# Patient Record
Sex: MALE | Race: WHITE | NOT HISPANIC OR LATINO | Employment: OTHER | URBAN - METROPOLITAN AREA
[De-identification: names, ages, dates, MRNs, and addresses within clinical notes are randomized per-mention and may not be internally consistent; named-entity substitution may affect disease eponyms.]

---

## 2023-02-08 ENCOUNTER — OFFICE VISIT (OUTPATIENT)
Dept: NEUROSURGERY | Facility: CLINIC | Age: 78
End: 2023-02-08
Payer: MEDICARE

## 2023-02-08 VITALS
TEMPERATURE: 97.1 F | SYSTOLIC BLOOD PRESSURE: 132 MMHG | DIASTOLIC BLOOD PRESSURE: 82 MMHG | WEIGHT: 200 LBS | HEIGHT: 71 IN | BODY MASS INDEX: 28 KG/M2

## 2023-02-08 DIAGNOSIS — M48.061 SPINAL STENOSIS, LUMBAR REGION, WITHOUT NEUROGENIC CLAUDICATION: ICD-10-CM

## 2023-02-08 DIAGNOSIS — M54.9 MECHANICAL BACK PAIN: ICD-10-CM

## 2023-02-08 DIAGNOSIS — M51.36 DISC DEGENERATION, LUMBAR: Primary | ICD-10-CM

## 2023-02-08 PROCEDURE — 99204 OFFICE O/P NEW MOD 45 MIN: CPT | Performed by: PHYSICIAN ASSISTANT

## 2023-02-08 RX ORDER — LISINOPRIL AND HYDROCHLOROTHIAZIDE 20; 12.5 MG/1; MG/1
1 TABLET ORAL DAILY
COMMUNITY

## 2023-02-08 RX ORDER — ASPIRIN 81 MG/1
81 TABLET, CHEWABLE ORAL DAILY
COMMUNITY

## 2023-02-08 RX ORDER — EZETIMIBE/ATORVASTATIN CALCIUM 10 MG-10MG
TABLET ORAL NIGHTLY
COMMUNITY
End: 2023-03-21

## 2023-02-08 RX ORDER — METOPROLOL TARTRATE 50 MG/1
50 TABLET, FILM COATED ORAL 2 TIMES DAILY
COMMUNITY
Start: 2022-11-07

## 2023-02-08 NOTE — PROGRESS NOTES
Patient: Brandon Brooks  : 1945  Chart #: [id number]    Date of Service: 2023    CHIEF COMPLAINT: Low back and right leg pain    History of Present Illness Patient is a very pleasant 77-year-old gentleman who is retired from the [employer] business.  He has had back difficulties dating back to the 1960s.  He underwent surgery with Dr. Rodriguez in the .  From what I gather, this may have involved the L4-5 and L5-S1 levels.  Today he complains of low back pain that radiates down the posterior lateral aspect of the right leg into the top of the foot.  This has been present for several years but has progressively worsened.  Last year he was treated with epidural injections and physical therapy which failed to provide lasting relief.  He has also been treated with gabapentin but was fairly intolerant of that.  Symptoms are not positional.  His leg tends to bother him when he is more active but the pain is constant.  He denies left-sided symptoms.  No weakness.  No bowel or bladder difficulties.      Past Medical History:   Diagnosis Date   • Arthritis    • Cancer (HCC)    • Hearing loss    • Inguinal hernia    • Low back pain          Current Outpatient Medications:   •  aspirin 81 MG chewable tablet, Daily., Disp: , Rfl:   •  Ezetimibe-Atorvastatin 10-10 MG tablet, Take  by mouth Every Night., Disp: , Rfl:   •  lisinopril-hydrochlorothiazide (PRINZIDE,ZESTORETIC) 20-12.5 MG per tablet, lisinopril 20 mg-hydrochlorothiazide 12.5 mg tablet  Take by oral route., Disp: , Rfl:   •  metoprolol tartrate (LOPRESSOR) 50 MG tablet, , Disp: , Rfl:     Past Surgical History:   Procedure Laterality Date   • HERNIA REPAIR     • LUMBAR DISC SURGERY      Dr. Santiago Rodriguez    • PROSTATE SURGERY         Social History     Socioeconomic History   • Marital status:    Tobacco Use   • Smoking status: Never   • Smokeless tobacco: Never   Vaping Use   • Vaping Use: Never used   Substance and Sexual Activity   • Alcohol use: Never    • Drug use: Never   • Sexual activity: Defer         Review of Systems   Constitutional: Positive for fatigue. Negative for activity change, appetite change, chills, diaphoresis, fever and unexpected weight change.   HENT: Negative for congestion, dental problem, drooling, ear discharge, ear pain, facial swelling, hearing loss, mouth sores, nosebleeds, postnasal drip, rhinorrhea, sinus pressure, sinus pain, sneezing, sore throat, tinnitus, trouble swallowing and voice change.    Eyes: Positive for itching and visual disturbance. Negative for photophobia, pain, discharge and redness.   Respiratory: Positive for chest tightness. Negative for apnea, cough, choking, shortness of breath, wheezing and stridor.    Cardiovascular: Positive for chest pain. Negative for palpitations and leg swelling.   Gastrointestinal: Negative for abdominal distention, abdominal pain, anal bleeding, blood in stool, constipation, diarrhea, nausea, rectal pain and vomiting.   Endocrine: Negative for cold intolerance, heat intolerance, polydipsia, polyphagia and polyuria.   Genitourinary: Negative for decreased urine volume, difficulty urinating, dysuria, enuresis, flank pain, frequency, genital sores, hematuria and urgency.   Musculoskeletal: Positive for back pain. Negative for arthralgias, gait problem, joint swelling, myalgias, neck pain and neck stiffness.   Skin: Negative for color change, pallor, rash and wound.   Allergic/Immunologic: Negative for environmental allergies, food allergies and immunocompromised state.   Neurological: Positive for dizziness and light-headedness. Negative for tremors, seizures, syncope, facial asymmetry, speech difficulty, weakness, numbness and headaches.   Hematological: Negative for adenopathy. Does not bruise/bleed easily.   Psychiatric/Behavioral: Negative for agitation, behavioral problems, confusion, decreased concentration, dysphoric mood, hallucinations, self-injury, sleep disturbance and  "suicidal ideas. The patient is not nervous/anxious and is not hyperactive.    All other systems reviewed and are negative.      Objective   Vital Signs: Blood pressure 132/82, temperature 97.1 °F (36.2 °C), temperature source Infrared, height 180.3 cm (71\"), weight 90.7 kg (200 lb).  Physical Exam  Vitals and nursing note reviewed.   Constitutional:       General: He is not in acute distress.     Appearance: He is well-developed.   HENT:      Head: Normocephalic and atraumatic.   Eyes:      Pupils: Pupils are equal, round, and reactive to light.   Cardiovascular:      Heart sounds: Normal heart sounds.   Pulmonary:      Breath sounds: Normal breath sounds.   Psychiatric:         Behavior: Behavior normal.         Thought Content: Thought content normal.     Musculoskeletal:     Strength is intact in upper and lower extremities to direct testing.     Station and gait are normal.     Straight leg raising is negative.   Neurologic:     Muscle tone is normal throughout.     Coordination is intact.     Deep tendon reflexes: Diminished in the lower extremity.     Sensation is intact to light touch throughout.     Patient is oriented to person, place, and time.       Independent review of radiographic imaging: MRI of the lumbar spine demonstrates some retrolisthesis of L3 on 4 with moderate canal stenosis.  At L4-5 there is bilateral foraminal narrowing at L5-S1 there is some foraminal narrowing.  Postoperative hemilaminectomy presumed to be at L5-S1    Assessment & Plan   Diagnosis:  1.  Lumbar radiculopathy  2.  Chronic back pain    Medical Decision Making: I reviewed imaging with Dr. Sood is recommended lumbar CT myelogram to better determine the level of nerve root compromise.  I have had a tanisha conversation with the patient today.  If we were to consider surgery, the goal would be to help his leg pain.  There will be no assurance that we can take away all of his back difficulties.  He is very understanding.  His " leg pain has been limiting him for some time.  He would like to be able to return to golf.  He will follow-up with Dr. Sood to review and further recommendations will be made at that time.    Diagnoses and all orders for this visit:    1. Disc degeneration, lumbar (Primary)  -     IR Myelogram Lumbar Spine; Future  -     CT Lumbar Spine With Intrathecal Contrast; Future    2. Spinal stenosis, lumbar region, without neurogenic claudication    3. Mechanical back pain    Other orders  -     Obtain Informed Consent; Standing  -     No Lab Testing Needed; Standing                      Yumiko Aguilera PA-C  Patient Care Team:  Juan Sosa MD as PCP - General (Family Medicine)

## 2023-02-14 ENCOUNTER — TELEPHONE (OUTPATIENT)
Dept: NEUROSURGERY | Facility: CLINIC | Age: 78
End: 2023-02-14
Payer: MEDICARE

## 2023-02-14 NOTE — TELEPHONE ENCOUNTER
Caller: ANDREIA GUADALUPE    Relationship to patient: Emergency Contact    Best call back number: [phone]    Patient is needing: PATIENTS WIFE CALLED TO CHECK STATUS OF SCHEDULING MYELOGRAM. PLEASE CALL PATIENT OR PATIENTS WIFE TO SCHEDULE.    THANK YOU

## 2023-02-27 ENCOUNTER — TELEPHONE (OUTPATIENT)
Dept: INFUSION THERAPY | Facility: HOSPITAL | Age: 78
End: 2023-02-27
Payer: MEDICARE

## 2023-02-27 RX ORDER — ACETAMINOPHEN 325 MG/1
650 TABLET ORAL EVERY 6 HOURS PRN
COMMUNITY

## 2023-02-27 NOTE — TELEPHONE ENCOUNTER
Verified pts appt time for 6 a.m., nothing to eat after midnight, can take home meds, and reviewed plan of care for procedure tomorrow.

## 2023-02-28 ENCOUNTER — HOSPITAL ENCOUNTER (OUTPATIENT)
Dept: GENERAL RADIOLOGY | Facility: HOSPITAL | Age: 78
Discharge: HOME | End: 2023-02-28
Payer: MEDICARE

## 2023-02-28 ENCOUNTER — HOSPITAL ENCOUNTER (OUTPATIENT)
Dept: CT IMAGING | Facility: HOSPITAL | Age: 78
Discharge: HOME | End: 2023-02-28
Payer: MEDICARE

## 2023-02-28 VITALS
DIASTOLIC BLOOD PRESSURE: 112 MMHG | SYSTOLIC BLOOD PRESSURE: 182 MMHG | OXYGEN SATURATION: 96 % | WEIGHT: 201 LBS | TEMPERATURE: 97.1 F | RESPIRATION RATE: 18 BRPM | HEART RATE: 70 BPM | HEIGHT: 71 IN | BODY MASS INDEX: 28.14 KG/M2

## 2023-02-28 DIAGNOSIS — M51.36 DISC DEGENERATION, LUMBAR: ICD-10-CM

## 2023-02-28 DIAGNOSIS — M54.16 LUMBAR RADICULOPATHY: ICD-10-CM

## 2023-02-28 PROCEDURE — 25510000001 IOPAMIDOL 41 % SOLUTION: Performed by: PHYSICIAN ASSISTANT

## 2023-02-28 PROCEDURE — 72132 CT LUMBAR SPINE W/DYE: CPT

## 2023-02-28 PROCEDURE — 63710000001 LISINOPRIL 20 MG TABLET: Performed by: NEUROLOGICAL SURGERY

## 2023-02-28 PROCEDURE — 72240 MYELOGRAPHY NECK SPINE: CPT

## 2023-02-28 PROCEDURE — 63710000001 METOPROLOL TARTRATE 50 MG TABLET: Performed by: NEUROLOGICAL SURGERY

## 2023-02-28 PROCEDURE — 62284 INJECTION FOR MYELOGRAM: CPT | Performed by: NEUROLOGICAL SURGERY

## 2023-02-28 PROCEDURE — 62304 MYELOGRAPHY LUMBAR INJECTION: CPT

## 2023-02-28 PROCEDURE — 72120 X-RAY BEND ONLY L-S SPINE: CPT

## 2023-02-28 PROCEDURE — A9270 NON-COVERED ITEM OR SERVICE: HCPCS | Performed by: NEUROLOGICAL SURGERY

## 2023-02-28 PROCEDURE — 0 LIDOCAINE 1 % SOLUTION: Performed by: PHYSICIAN ASSISTANT

## 2023-02-28 RX ORDER — LISINOPRIL 20 MG/1
20 TABLET ORAL ONCE
Status: COMPLETED | OUTPATIENT
Start: 2023-02-28 | End: 2023-02-28

## 2023-02-28 RX ORDER — LIDOCAINE HYDROCHLORIDE 10 MG/ML
5 INJECTION, SOLUTION INFILTRATION; PERINEURAL ONCE
Status: COMPLETED | OUTPATIENT
Start: 2023-02-28 | End: 2023-02-28

## 2023-02-28 RX ORDER — PROMETHAZINE HYDROCHLORIDE 25 MG/1
25 TABLET ORAL ONCE AS NEEDED
Status: DISCONTINUED | OUTPATIENT
Start: 2023-02-28 | End: 2023-03-02 | Stop reason: HOSPADM

## 2023-02-28 RX ORDER — ONDANSETRON 4 MG/1
4 TABLET, FILM COATED ORAL ONCE AS NEEDED
Status: DISCONTINUED | OUTPATIENT
Start: 2023-02-28 | End: 2023-03-02 | Stop reason: HOSPADM

## 2023-02-28 RX ORDER — METOPROLOL TARTRATE 50 MG/1
50 TABLET, FILM COATED ORAL ONCE
Status: COMPLETED | OUTPATIENT
Start: 2023-02-28 | End: 2023-02-28

## 2023-02-28 RX ADMIN — LIDOCAINE HYDROCHLORIDE 5 ML: 10 INJECTION, SOLUTION INFILTRATION; PERINEURAL at 07:46

## 2023-02-28 RX ADMIN — IOPAMIDOL 20 ML: 408 INJECTION, SOLUTION INTRATHECAL at 07:45

## 2023-02-28 RX ADMIN — LISINOPRIL 20 MG: 20 TABLET ORAL at 06:52

## 2023-02-28 RX ADMIN — METOPROLOL TARTRATE 50 MG: 50 TABLET, FILM COATED ORAL at 06:52

## 2023-03-01 ENCOUNTER — TELEPHONE (OUTPATIENT)
Dept: INFUSION THERAPY | Facility: HOSPITAL | Age: 78
End: 2023-03-01
Payer: MEDICARE

## 2023-03-03 ENCOUNTER — OFFICE VISIT (OUTPATIENT)
Dept: NEUROSURGERY | Facility: CLINIC | Age: 78
End: 2023-03-03
Payer: MEDICARE

## 2023-03-03 ENCOUNTER — PREP FOR SURGERY (OUTPATIENT)
Dept: OTHER | Facility: HOSPITAL | Age: 78
End: 2023-03-03
Payer: MEDICARE

## 2023-03-03 VITALS — BODY MASS INDEX: 28.59 KG/M2 | WEIGHT: 204.2 LBS | HEIGHT: 71 IN | TEMPERATURE: 97.7 F

## 2023-03-03 DIAGNOSIS — M54.16 LUMBAR RADICULOPATHY, RIGHT: ICD-10-CM

## 2023-03-03 DIAGNOSIS — M43.10 SPONDYLOLISTHESIS, ACQUIRED: Primary | ICD-10-CM

## 2023-03-03 DIAGNOSIS — M51.36 DISC DEGENERATION, LUMBAR: ICD-10-CM

## 2023-03-03 DIAGNOSIS — M48.061 SPINAL STENOSIS, LUMBAR REGION, WITHOUT NEUROGENIC CLAUDICATION: Primary | ICD-10-CM

## 2023-03-03 PROBLEM — E78.5 HLD (HYPERLIPIDEMIA): Status: ACTIVE | Noted: 2023-03-03

## 2023-03-03 PROBLEM — N18.9 CKD (CHRONIC KIDNEY DISEASE): Status: ACTIVE | Noted: 2023-03-03

## 2023-03-03 PROBLEM — R00.2 PALPITATIONS: Status: ACTIVE | Noted: 2023-03-03

## 2023-03-03 PROBLEM — E11.9 DIABETES: Status: ACTIVE | Noted: 2023-03-03

## 2023-03-03 PROBLEM — M19.90 ARTHRITIS: Status: ACTIVE | Noted: 2023-03-03

## 2023-03-03 PROBLEM — R53.83 FATIGUE: Status: ACTIVE | Noted: 2023-03-03

## 2023-03-03 PROBLEM — I49.3 PVC (PREMATURE VENTRICULAR CONTRACTION): Status: ACTIVE | Noted: 2023-03-03

## 2023-03-03 PROBLEM — I10 HYPERTENSION: Status: ACTIVE | Noted: 2023-03-03

## 2023-03-03 PROCEDURE — 99214 OFFICE O/P EST MOD 30 MIN: CPT | Performed by: NEUROLOGICAL SURGERY

## 2023-03-03 RX ORDER — OXYCODONE HCL 10 MG/1
10 TABLET, FILM COATED, EXTENDED RELEASE ORAL ONCE
Status: CANCELLED | OUTPATIENT
Start: 2023-03-03 | End: 2023-03-03

## 2023-03-03 RX ORDER — CHLORHEXIDINE GLUCONATE 4 G/100ML
SOLUTION TOPICAL
Qty: 120 ML | Refills: 0 | Status: ON HOLD | OUTPATIENT
Start: 2023-03-03 | End: 2023-03-27

## 2023-03-03 RX ORDER — ACETAMINOPHEN 500 MG
1000 TABLET ORAL ONCE
Status: CANCELLED | OUTPATIENT
Start: 2023-03-03 | End: 2023-03-03

## 2023-03-03 RX ORDER — CLONIDINE HYDROCHLORIDE 0.1 MG/1
0.1 TABLET ORAL AS NEEDED
COMMUNITY
Start: 2023-02-28

## 2023-03-03 RX ORDER — FAMOTIDINE 20 MG/1
20 TABLET, FILM COATED ORAL
Status: CANCELLED | OUTPATIENT
Start: 2023-03-03

## 2023-03-03 RX ORDER — IBUPROFEN 800 MG/1
800 TABLET ORAL ONCE
Status: CANCELLED | OUTPATIENT
Start: 2023-03-03 | End: 2023-03-03

## 2023-03-03 RX ORDER — CEFAZOLIN SODIUM 2 G/100ML
2 INJECTION, SOLUTION INTRAVENOUS ONCE
Status: CANCELLED | OUTPATIENT
Start: 2023-03-03 | End: 2023-03-03

## 2023-03-03 NOTE — H&P (VIEW-ONLY)
Patient: Brandon Brooks  : 1945     Primary Care Provider: Juan Sosa MD     Requesting Provider: As above           History     Chief Complaint: Low back and right leg pain.     History of Present Illness: Mr. Brooks is a very pleasant 77-year-old gentleman who is retired from the [email or web address] business.  He has had back difficulties dating back to the 1960s.  He underwent surgery with Dr. Rodriguez in the s.  From what I gather, this may have involved the L4-5 and L5-S1 levels.  Today he complains of low back pain that radiates down the posterior lateral aspect of the right leg into the top of the foot.  This has been present for several years but has progressively worsened.  Last year he was treated with epidural injections and physical therapy which failed to provide lasting relief.  He has also been treated with gabapentin but was fairly intolerant of that.  Symptoms are not positional.  His leg tends to bother him when he is more active but the pain is constant.  He denies left-sided symptoms.  No weakness.  No bowel or bladder difficulties.     Review of Systems   Constitutional: Positive for fatigue. Negative for activity change, appetite change, chills, diaphoresis, fever and unexpected weight change.   HENT: Negative for congestion, dental problem, drooling, ear discharge, ear pain, facial swelling, hearing loss, mouth sores, nosebleeds, postnasal drip, rhinorrhea, sinus pressure, sinus pain, sneezing, sore throat, tinnitus, trouble swallowing and voice change.    Eyes: Positive for itching and visual disturbance. Negative for photophobia, pain, discharge and redness.   Respiratory: Positive for chest tightness. Negative for apnea, cough, choking, shortness of breath, wheezing and stridor.    Cardiovascular: Positive for chest pain. Negative for palpitations and leg swelling.   Gastrointestinal: Negative for abdominal distention, abdominal pain, anal bleeding, blood in stool, constipation, diarrhea,  nausea, rectal pain and vomiting.   Endocrine: Negative for cold intolerance, heat intolerance, polydipsia, polyphagia and polyuria.   Genitourinary: Negative for decreased urine volume, difficulty urinating, dysuria, enuresis, flank pain, frequency, genital sores, hematuria and urgency.   Musculoskeletal: Positive for back pain. Negative for arthralgias, gait problem, joint swelling, myalgias, neck pain and neck stiffness.   Skin: Negative for color change, pallor, rash and wound.   Allergic/Immunologic: Negative for environmental allergies, food allergies and immunocompromised state.   Neurological: Positive for dizziness and light-headedness. Negative for tremors, seizures, syncope, facial asymmetry, speech difficulty, weakness, numbness and headaches.   Hematological: Negative for adenopathy. Does not bruise/bleed easily.   Psychiatric/Behavioral: Negative for agitation, behavioral problems, confusion, decreased concentration, dysphoric mood, hallucinations, self-injury, sleep disturbance and suicidal ideas. The patient is not nervous/anxious and is not hyperactive.    All other systems reviewed and are negative.        The patient's past medical history, past surgical history, family history, and social history have been reviewed at length in the electronic medical record.  Past Medical History:   Diagnosis Date   • Arthritis    • Cancer (HCC)    • Hearing loss    • Inguinal hernia    • Low back pain      Past Surgical History:   Procedure Laterality Date   • HERNIA REPAIR     • LUMBAR DISC SURGERY      Dr. Santiago Rodriguez 1990   • PROSTATE SURGERY       Family History   Problem Relation Age of Onset   • Cancer Mother    • Heart disease Father    • Cancer Brother      Social History     Socioeconomic History   • Marital status:    Tobacco Use   • Smoking status: Never   • Smokeless tobacco: Never   Vaping Use   • Vaping Use: Never used   Substance and Sexual Activity   • Alcohol use: Never   • Drug use: Never  "  • Sexual activity: Defer           Allergies   Allergen Reactions   • Levofloxacin Other (See Comments)       Current Outpatient Medications on File Prior to Visit   Medication Sig Dispense Refill   • acetaminophen (TYLENOL) 325 MG tablet Take 2 tablets by mouth Every 6 (Six) Hours As Needed for Mild Pain. On rare occassion     • aspirin 81 MG chewable tablet Daily.     • cloNIDine (CATAPRES) 0.1 MG tablet      • Ezetimibe-Atorvastatin 10-10 MG tablet Take  by mouth Every Night.     • lisinopril-hydrochlorothiazide (PRINZIDE,ZESTORETIC) 20-12.5 MG per tablet lisinopril 20 mg-hydrochlorothiazide 12.5 mg tablet   Take by oral route.     • metoprolol tartrate (LOPRESSOR) 50 MG tablet        No current facility-administered medications on file prior to visit.            Physical Exam:   Temp 97.7 °F (36.5 °C)   Ht 180.3 cm (71\")   Wt 92.6 kg (204 lb 3.2 oz)   BMI 28.48 kg/m²   MUSCULOSKELETAL:  Straight leg raising is negative.  Wilber's Sign is negative.  ROM in the low back is normal.  Tenderness in the back to palpation is not observed.  NEUROLOGICAL:  Strength is intact in the lower extremities to direct testing.  Muscle tone is normal throughout.  Station and gait are normal.  Sensation is intact to light touch testing throughout.  Coordination is intact.        Medical Decision Making     Data Review:   (All imaging studies were personally reviewed unless stated otherwise)  Lumbar CT myelogram demonstrates some truncation of the left at L2-3 and on the right at L3-4.  There is significant disc space narrowing at L5-S1 with foraminal compromise particularly on the right.  Laminotomy defect is noted on the right at L4-5.  There is a retrolisthesis of L3 on L4 that is stable.  There is a low-grade listhesis of L5 on S1 that is also stable.     Diagnosis:   The patient's symptoms really are that of an L5 radiculopathy.  He has no symptoms to suggest an L4 nerve root compromise.  I think his L5 symptoms are " emanating from foraminal compromise on the right at L5-S1.     Treatment Options:   The patient is struggling and as such I have recommended L5-S1 PLIF.  The nature of the procedure as well as the potential risks, complications, limitations, and alternatives to the procedure were discussed at length with the patient and the patient has agreed to proceed with surgery.  The goal of surgery is to improve his symptoms.  It will not necessarily eradicate his symptoms some of which are very longstanding.          Diagnosis Plan   1. Spinal stenosis, lumbar region, without neurogenic claudication          2. Lumbar radiculopathy, right          3. Disc degeneration, lumbar

## 2023-03-03 NOTE — PROGRESS NOTES
Patient: Brandon Brooks  : 1945    Primary Care Provider: Juan Sosa MD    Requesting Provider: As above        History    Chief Complaint: Low back and right leg pain.    History of Present Illness: Mr. Brooks is a very pleasant 77-year-old gentleman who is retired from the [employer] business.  He has had back difficulties dating back to the 1960s.  He underwent surgery with Dr. Rodriguez in the s.  From what I gather, this may have involved the L4-5 and L5-S1 levels.  Today he complains of low back pain that radiates down the posterior lateral aspect of the right leg into the top of the foot.  This has been present for several years but has progressively worsened.  Last year he was treated with epidural injections and physical therapy which failed to provide lasting relief.  He has also been treated with gabapentin but was fairly intolerant of that.  Symptoms are not positional.  His leg tends to bother him when he is more active but the pain is constant.  He denies left-sided symptoms.  No weakness.  No bowel or bladder difficulties.    Review of Systems   Constitutional: Positive for fatigue. Negative for activity change, appetite change, chills, diaphoresis, fever and unexpected weight change.   HENT: Negative for congestion, dental problem, drooling, ear discharge, ear pain, facial swelling, hearing loss, mouth sores, nosebleeds, postnasal drip, rhinorrhea, sinus pressure, sinus pain, sneezing, sore throat, tinnitus, trouble swallowing and voice change.    Eyes: Positive for itching and visual disturbance. Negative for photophobia, pain, discharge and redness.   Respiratory: Positive for chest tightness. Negative for apnea, cough, choking, shortness of breath, wheezing and stridor.    Cardiovascular: Positive for chest pain. Negative for palpitations and leg swelling.   Gastrointestinal: Negative for abdominal distention, abdominal pain, anal bleeding, blood in stool, constipation, diarrhea, nausea,  "rectal pain and vomiting.   Endocrine: Negative for cold intolerance, heat intolerance, polydipsia, polyphagia and polyuria.   Genitourinary: Negative for decreased urine volume, difficulty urinating, dysuria, enuresis, flank pain, frequency, genital sores, hematuria and urgency.   Musculoskeletal: Positive for back pain. Negative for arthralgias, gait problem, joint swelling, myalgias, neck pain and neck stiffness.   Skin: Negative for color change, pallor, rash and wound.   Allergic/Immunologic: Negative for environmental allergies, food allergies and immunocompromised state.   Neurological: Positive for dizziness and light-headedness. Negative for tremors, seizures, syncope, facial asymmetry, speech difficulty, weakness, numbness and headaches.   Hematological: Negative for adenopathy. Does not bruise/bleed easily.   Psychiatric/Behavioral: Negative for agitation, behavioral problems, confusion, decreased concentration, dysphoric mood, hallucinations, self-injury, sleep disturbance and suicidal ideas. The patient is not nervous/anxious and is not hyperactive.    All other systems reviewed and are negative.      The patient's past medical history, past surgical history, family history, and social history have been reviewed at length in the electronic medical record.      Physical Exam:   Temp 97.7 °F (36.5 °C)   Ht 180.3 cm (71\")   Wt 92.6 kg (204 lb 3.2 oz)   BMI 28.48 kg/m²   MUSCULOSKELETAL:  Straight leg raising is negative.  Wilber's Sign is negative.  ROM in the low back is normal.  Tenderness in the back to palpation is not observed.  NEUROLOGICAL:  Strength is intact in the lower extremities to direct testing.  Muscle tone is normal throughout.  Station and gait are normal.  Sensation is intact to light touch testing throughout.  Coordination is intact.      Medical Decision Making    Data Review:   (All imaging studies were personally reviewed unless stated otherwise)  Lumbar CT myelogram demonstrates " some truncation of the left at L2-3 and on the right at L3-4.  There is significant disc space narrowing at L5-S1 with foraminal compromise particularly on the right.  Laminotomy defect is noted on the right at L4-5.  There is a retrolisthesis of L3 on L4 that is stable.  There is a low-grade listhesis of L5 on S1 that is also stable.    Diagnosis:   The patient's symptoms really are that of an L5 radiculopathy.  He has no symptoms to suggest an L4 nerve root compromise.  I think his L5 symptoms are emanating from foraminal compromise on the right at L5-S1.    Treatment Options:   The patient is struggling and as such I have recommended L5-S1 PLIF.  The nature of the procedure as well as the potential risks, complications, limitations, and alternatives to the procedure were discussed at length with the patient and the patient has agreed to proceed with surgery.  The goal of surgery is to improve his symptoms.  It will not necessarily eradicate his symptoms some of which are very longstanding.       Diagnosis Plan   1. Spinal stenosis, lumbar region, without neurogenic claudication        2. Lumbar radiculopathy, right        3. Disc degeneration, lumbar            Scribed for Lenin Sood MD by Laura Washington CMA. 3/3/2023 13:05 EST    I, Dr. Sood, personally performed the services described in the documentation, as scribed in my presence, and it is both accurate and complete.

## 2023-03-03 NOTE — H&P
Patient: Brandon Brooks  : 1945     Primary Care Provider: Juan Sosa MD     Requesting Provider: As above           History     Chief Complaint: Low back and right leg pain.     History of Present Illness: Mr. Brooks is a very pleasant 77-year-old gentleman who is retired from the [employer] business.  He has had back difficulties dating back to the 1960s.  He underwent surgery with Dr. Rodriguez in the s.  From what I gather, this may have involved the L4-5 and L5-S1 levels.  Today he complains of low back pain that radiates down the posterior lateral aspect of the right leg into the top of the foot.  This has been present for several years but has progressively worsened.  Last year he was treated with epidural injections and physical therapy which failed to provide lasting relief.  He has also been treated with gabapentin but was fairly intolerant of that.  Symptoms are not positional.  His leg tends to bother him when he is more active but the pain is constant.  He denies left-sided symptoms.  No weakness.  No bowel or bladder difficulties.     Review of Systems   Constitutional: Positive for fatigue. Negative for activity change, appetite change, chills, diaphoresis, fever and unexpected weight change.   HENT: Negative for congestion, dental problem, drooling, ear discharge, ear pain, facial swelling, hearing loss, mouth sores, nosebleeds, postnasal drip, rhinorrhea, sinus pressure, sinus pain, sneezing, sore throat, tinnitus, trouble swallowing and voice change.    Eyes: Positive for itching and visual disturbance. Negative for photophobia, pain, discharge and redness.   Respiratory: Positive for chest tightness. Negative for apnea, cough, choking, shortness of breath, wheezing and stridor.    Cardiovascular: Positive for chest pain. Negative for palpitations and leg swelling.   Gastrointestinal: Negative for abdominal distention, abdominal pain, anal bleeding, blood in stool, constipation, diarrhea,  nausea, rectal pain and vomiting.   Endocrine: Negative for cold intolerance, heat intolerance, polydipsia, polyphagia and polyuria.   Genitourinary: Negative for decreased urine volume, difficulty urinating, dysuria, enuresis, flank pain, frequency, genital sores, hematuria and urgency.   Musculoskeletal: Positive for back pain. Negative for arthralgias, gait problem, joint swelling, myalgias, neck pain and neck stiffness.   Skin: Negative for color change, pallor, rash and wound.   Allergic/Immunologic: Negative for environmental allergies, food allergies and immunocompromised state.   Neurological: Positive for dizziness and light-headedness. Negative for tremors, seizures, syncope, facial asymmetry, speech difficulty, weakness, numbness and headaches.   Hematological: Negative for adenopathy. Does not bruise/bleed easily.   Psychiatric/Behavioral: Negative for agitation, behavioral problems, confusion, decreased concentration, dysphoric mood, hallucinations, self-injury, sleep disturbance and suicidal ideas. The patient is not nervous/anxious and is not hyperactive.    All other systems reviewed and are negative.        The patient's past medical history, past surgical history, family history, and social history have been reviewed at length in the electronic medical record.  Past Medical History:   Diagnosis Date   • Arthritis    • Cancer (HCC)    • Hearing loss    • Inguinal hernia    • Low back pain      Past Surgical History:   Procedure Laterality Date   • HERNIA REPAIR     • LUMBAR DISC SURGERY      Dr. Santiago Rodriguez 1990   • PROSTATE SURGERY       Family History   Problem Relation Age of Onset   • Cancer Mother    • Heart disease Father    • Cancer Brother      Social History     Socioeconomic History   • Marital status:    Tobacco Use   • Smoking status: Never   • Smokeless tobacco: Never   Vaping Use   • Vaping Use: Never used   Substance and Sexual Activity   • Alcohol use: Never   • Drug use: Never  "  • Sexual activity: Defer           Allergies   Allergen Reactions   • Levofloxacin Other (See Comments)       Current Outpatient Medications on File Prior to Visit   Medication Sig Dispense Refill   • acetaminophen (TYLENOL) 325 MG tablet Take 2 tablets by mouth Every 6 (Six) Hours As Needed for Mild Pain. On rare occassion     • aspirin 81 MG chewable tablet Daily.     • cloNIDine (CATAPRES) 0.1 MG tablet      • Ezetimibe-Atorvastatin 10-10 MG tablet Take  by mouth Every Night.     • lisinopril-hydrochlorothiazide (PRINZIDE,ZESTORETIC) 20-12.5 MG per tablet lisinopril 20 mg-hydrochlorothiazide 12.5 mg tablet   Take by oral route.     • metoprolol tartrate (LOPRESSOR) 50 MG tablet        No current facility-administered medications on file prior to visit.            Physical Exam:   Temp 97.7 °F (36.5 °C)   Ht 180.3 cm (71\")   Wt 92.6 kg (204 lb 3.2 oz)   BMI 28.48 kg/m²   MUSCULOSKELETAL:  Straight leg raising is negative.  Wilber's Sign is negative.  ROM in the low back is normal.  Tenderness in the back to palpation is not observed.  NEUROLOGICAL:  Strength is intact in the lower extremities to direct testing.  Muscle tone is normal throughout.  Station and gait are normal.  Sensation is intact to light touch testing throughout.  Coordination is intact.        Medical Decision Making     Data Review:   (All imaging studies were personally reviewed unless stated otherwise)  Lumbar CT myelogram demonstrates some truncation of the left at L2-3 and on the right at L3-4.  There is significant disc space narrowing at L5-S1 with foraminal compromise particularly on the right.  Laminotomy defect is noted on the right at L4-5.  There is a retrolisthesis of L3 on L4 that is stable.  There is a low-grade listhesis of L5 on S1 that is also stable.     Diagnosis:   The patient's symptoms really are that of an L5 radiculopathy.  He has no symptoms to suggest an L4 nerve root compromise.  I think his L5 symptoms are " emanating from foraminal compromise on the right at L5-S1.     Treatment Options:   The patient is struggling and as such I have recommended L5-S1 PLIF.  The nature of the procedure as well as the potential risks, complications, limitations, and alternatives to the procedure were discussed at length with the patient and the patient has agreed to proceed with surgery.  The goal of surgery is to improve his symptoms.  It will not necessarily eradicate his symptoms some of which are very longstanding.          Diagnosis Plan   1. Spinal stenosis, lumbar region, without neurogenic claudication          2. Lumbar radiculopathy, right          3. Disc degeneration, lumbar

## 2023-03-21 ENCOUNTER — PRE-ADMISSION TESTING (OUTPATIENT)
Dept: PREADMISSION TESTING | Facility: HOSPITAL | Age: 78
End: 2023-03-21
Payer: MEDICARE

## 2023-03-21 VITALS — WEIGHT: 203.37 LBS | BODY MASS INDEX: 28.47 KG/M2 | HEIGHT: 71 IN

## 2023-03-21 DIAGNOSIS — M43.10 SPONDYLOLISTHESIS, ACQUIRED: ICD-10-CM

## 2023-03-21 LAB
DEPRECATED RDW RBC AUTO: 43.2 FL (ref 37–54)
ERYTHROCYTE [DISTWIDTH] IN BLOOD BY AUTOMATED COUNT: 12.2 % (ref 12.3–15.4)
HBA1C MFR BLD: 5.4 % (ref 4.8–5.6)
HCT VFR BLD AUTO: 50 % (ref 37.5–51)
HGB BLD-MCNC: 17.2 G/DL (ref 13–17.7)
MCH RBC QN AUTO: 33.3 PG (ref 26.6–33)
MCHC RBC AUTO-ENTMCNC: 34.4 G/DL (ref 31.5–35.7)
MCV RBC AUTO: 96.7 FL (ref 79–97)
PLATELET # BLD AUTO: 224 10*3/MM3 (ref 140–450)
PMV BLD AUTO: 8.6 FL (ref 6–12)
POTASSIUM SERPL-SCNC: 4.5 MMOL/L (ref 3.5–5.2)
QT INTERVAL: 436 MS
QTC INTERVAL: 424 MS
RBC # BLD AUTO: 5.17 10*6/MM3 (ref 4.14–5.8)
WBC NRBC COR # BLD: 7.21 10*3/MM3 (ref 3.4–10.8)

## 2023-03-21 PROCEDURE — 93005 ELECTROCARDIOGRAM TRACING: CPT

## 2023-03-21 PROCEDURE — 85027 COMPLETE CBC AUTOMATED: CPT

## 2023-03-21 PROCEDURE — 84132 ASSAY OF SERUM POTASSIUM: CPT

## 2023-03-21 PROCEDURE — 83036 HEMOGLOBIN GLYCOSYLATED A1C: CPT

## 2023-03-21 PROCEDURE — 87081 CULTURE SCREEN ONLY: CPT

## 2023-03-21 PROCEDURE — 93010 ELECTROCARDIOGRAM REPORT: CPT | Performed by: INTERNAL MEDICINE

## 2023-03-21 PROCEDURE — 36415 COLL VENOUS BLD VENIPUNCTURE: CPT

## 2023-03-21 RX ORDER — EZETIMIBE 10 MG/1
10 TABLET ORAL DAILY
COMMUNITY

## 2023-03-21 NOTE — PAT
Patient to apply Chlorhexadine wipes  to surgical area (as instructed) the night before procedure and the AM of procedure. Wipes provided.    Bactroban (if prescribed) and Chlorhexidine Prescription prescribed by physician before PAT visit.  Verified with patient that medication(s) were picked up from their pharmacy.  Written instructions given to patient during PAT visit.  Patient/family also instructed to complete skin prep checklist and return the checklist on the day of surgery to preoperative staff.  Patient/family verbalized understanding.    Patient instructed to drink 20 ounces of Gatorade and it needs to be completed 1 hour (for Main OR patients) or 2 hours (scheduled  section & [facility] patients) before given arrival time for procedure (NO RED Gatorade)    Patient verbalized understanding.    Per Anesthesia Request, patient instructed not to take their ACE/ARB medications on the AM of surgery.    Patient viewed general PAT education video as instructed in their preoperative information received from their surgeon.  Patient stated the general PAT education video was viewed in its entirety and survey completed.  Copies of PAT general education handouts (Incentive Spirometry, Meds to Beds Program, Patient Belongings, Pre-op skin preparation instructions, Blood Glucose testing, Visitor policy, Surgery FAQ, Code H) distributed to patient if not printed. Education related to the PAT pass and skin preparation for surgery (if applicable) completed in PAT as a reinforcement to PAT education video. Patient instructed to return PAT pass provided today as well as completed skin preparation sheet (if applicable) on the day of procedure.     Additionally if patient had not viewed video yet but intended to view it at home or in our waiting area, then referred them to the handout with QR code/link provided during PAT visit.  Instructed patient to complete survey after viewing the video in its entirety.   Encouraged patient/family to read PAT general education handouts thoroughly and notify PAT staff with any questions or concerns. Patient verbalized understanding of all information and priority content.

## 2023-03-22 LAB — MRSA SPEC QL CULT: NORMAL

## 2023-03-26 ENCOUNTER — ANESTHESIA EVENT (OUTPATIENT)
Dept: PERIOP | Facility: HOSPITAL | Age: 78
End: 2023-03-26
Payer: MEDICARE

## 2023-03-26 RX ORDER — SODIUM CHLORIDE 9 MG/ML
40 INJECTION, SOLUTION INTRAVENOUS AS NEEDED
Status: CANCELLED | OUTPATIENT
Start: 2023-03-26

## 2023-03-26 RX ORDER — SODIUM CHLORIDE 0.9 % (FLUSH) 0.9 %
10 SYRINGE (ML) INJECTION EVERY 12 HOURS SCHEDULED
Status: CANCELLED | OUTPATIENT
Start: 2023-03-26

## 2023-03-26 RX ORDER — SODIUM CHLORIDE 0.9 % (FLUSH) 0.9 %
10 SYRINGE (ML) INJECTION AS NEEDED
Status: CANCELLED | OUTPATIENT
Start: 2023-03-26

## 2023-03-27 ENCOUNTER — HOSPITAL ENCOUNTER (OUTPATIENT)
Facility: HOSPITAL | Age: 78
LOS: 1 days | Discharge: HOME | End: 2023-03-30
Attending: NEUROLOGICAL SURGERY | Admitting: NEUROLOGICAL SURGERY
Payer: MEDICARE

## 2023-03-27 ENCOUNTER — APPOINTMENT (OUTPATIENT)
Dept: GENERAL RADIOLOGY | Facility: HOSPITAL | Age: 78
End: 2023-03-27
Payer: MEDICARE

## 2023-03-27 ENCOUNTER — ANESTHESIA (OUTPATIENT)
Dept: PERIOP | Facility: HOSPITAL | Age: 78
End: 2023-03-27
Payer: MEDICARE

## 2023-03-27 DIAGNOSIS — M43.10 SPONDYLOLISTHESIS, ACQUIRED: ICD-10-CM

## 2023-03-27 DIAGNOSIS — M54.16 LUMBAR RADICULOPATHY: Primary | ICD-10-CM

## 2023-03-27 LAB
GLUCOSE BLDC GLUCOMTR-MCNC: 136 MG/DL (ref 70–130)
GLUCOSE BLDC GLUCOMTR-MCNC: 93 MG/DL (ref 70–130)

## 2023-03-27 PROCEDURE — A9270 NON-COVERED ITEM OR SERVICE: HCPCS | Performed by: NEUROLOGICAL SURGERY

## 2023-03-27 PROCEDURE — 63052 LAM FACETC/FRMT ARTHRD LUM 1: CPT | Performed by: NEUROLOGICAL SURGERY

## 2023-03-27 PROCEDURE — 22630 ARTHRD PST TQ 1NTRSPC LUM: CPT

## 2023-03-27 PROCEDURE — 76000 FLUOROSCOPY <1 HR PHYS/QHP: CPT

## 2023-03-27 PROCEDURE — 25010000002 PROPOFOL 10 MG/ML EMULSION

## 2023-03-27 PROCEDURE — 61783 SCAN PROC SPINAL: CPT | Performed by: NEUROLOGICAL SURGERY

## 2023-03-27 PROCEDURE — 63710000001 ASPIRIN 81 MG CHEWABLE TABLET: Performed by: NEUROLOGICAL SURGERY

## 2023-03-27 PROCEDURE — 25010000002 VANCOMYCIN 1 G RECONSTITUTED SOLUTION: Performed by: NEUROLOGICAL SURGERY

## 2023-03-27 PROCEDURE — 22853 INSJ BIOMECHANICAL DEVICE: CPT | Performed by: NEUROLOGICAL SURGERY

## 2023-03-27 PROCEDURE — 63052 LAM FACETC/FRMT ARTHRD LUM 1: CPT

## 2023-03-27 PROCEDURE — 25010000002 DEXAMETHASONE PER 1 MG

## 2023-03-27 PROCEDURE — C1713 ANCHOR/SCREW BN/BN,TIS/BN: HCPCS | Performed by: NEUROLOGICAL SURGERY

## 2023-03-27 PROCEDURE — 22840 INSERT SPINE FIXATION DEVICE: CPT

## 2023-03-27 PROCEDURE — 82962 GLUCOSE BLOOD TEST: CPT

## 2023-03-27 PROCEDURE — G0378 HOSPITAL OBSERVATION PER HR: HCPCS

## 2023-03-27 PROCEDURE — 63710000001 METOPROLOL TARTRATE 50 MG TABLET: Performed by: NEUROLOGICAL SURGERY

## 2023-03-27 PROCEDURE — 63710000001 OXYCODONE-ACETAMINOPHEN 7.5-325 MG TABLET: Performed by: NEUROLOGICAL SURGERY

## 2023-03-27 PROCEDURE — 63710000001 FAMOTIDINE 20 MG TABLET: Performed by: NEUROLOGICAL SURGERY

## 2023-03-27 PROCEDURE — 22840 INSERT SPINE FIXATION DEVICE: CPT | Performed by: NEUROLOGICAL SURGERY

## 2023-03-27 PROCEDURE — 25010000002 ONDANSETRON PER 1 MG

## 2023-03-27 PROCEDURE — 25010000002 FENTANYL CITRATE (PF) 100 MCG/2ML SOLUTION

## 2023-03-27 PROCEDURE — 63710000001 ACETAMINOPHEN 325 MG TABLET: Performed by: NEUROLOGICAL SURGERY

## 2023-03-27 PROCEDURE — 63710000001 DIPHENHYDRAMINE PER 50 MG: Performed by: NEUROLOGICAL SURGERY

## 2023-03-27 PROCEDURE — 22630 ARTHRD PST TQ 1NTRSPC LUM: CPT | Performed by: NEUROLOGICAL SURGERY

## 2023-03-27 PROCEDURE — 25010000002 CEFAZOLIN IN DEXTROSE 2-4 GM/100ML-% SOLUTION: Performed by: NEUROLOGICAL SURGERY

## 2023-03-27 PROCEDURE — 22853 INSJ BIOMECHANICAL DEVICE: CPT

## 2023-03-27 DEVICE — PEEK ROD 1608451040 6.0 X 6.9MM STR 40MM
Type: IMPLANTABLE DEVICE | Site: SPINE LUMBAR | Status: FUNCTIONAL
Brand: CD HORIZON™ ASTUTE™ SPINAL SYSTEM

## 2023-03-27 DEVICE — SCREW 55840016550 5.5 CNMAS 6.5X50 CC
Type: IMPLANTABLE DEVICE | Site: SPINE LUMBAR | Status: FUNCTIONAL
Brand: CD HORIZON® SPINAL SYSTEM

## 2023-03-27 DEVICE — FLOSEAL HEMOSTATIC MATRIX, 10ML
Type: IMPLANTABLE DEVICE | Site: SPINE LUMBAR | Status: FUNCTIONAL
Brand: FLOSEAL HEMOSTATIC MATRIX

## 2023-03-27 DEVICE — SPACER 84332410 ADAPTIX 24MM X 10MM
Type: IMPLANTABLE DEVICE | Site: SPINE LUMBAR | Status: FUNCTIONAL
Brand: ADAPTIX™ INTERBODY SYSTEM WITH TITAN NANOLOCK™ SURFACE TECHNOLOGY

## 2023-03-27 DEVICE — DBM T44145 5CC ORTHOBLEND SMALL DEFGRAFT
Type: IMPLANTABLE DEVICE | Site: SPINE LUMBAR | Status: FUNCTIONAL
Brand: GRAFTON®AND GRAFTON PLUS®DEMINERALIZED BONE MATRIX (DBM)

## 2023-03-27 DEVICE — HEMOST ABS SURGIFOAM SZ100 8X12 10MM: Type: IMPLANTABLE DEVICE | Site: SPINE LUMBAR | Status: FUNCTIONAL

## 2023-03-27 RX ORDER — NALOXONE HCL 0.4 MG/ML
0.4 VIAL (ML) INJECTION
Status: DISCONTINUED | OUTPATIENT
Start: 2023-03-27 | End: 2023-03-30 | Stop reason: HOSPADM

## 2023-03-27 RX ORDER — OXYCODONE HCL 10 MG/1
10 TABLET, FILM COATED, EXTENDED RELEASE ORAL ONCE
Status: COMPLETED | OUTPATIENT
Start: 2023-03-27 | End: 2023-03-27

## 2023-03-27 RX ORDER — ASPIRIN 81 MG/1
81 TABLET, CHEWABLE ORAL DAILY
Status: DISCONTINUED | OUTPATIENT
Start: 2023-03-27 | End: 2023-03-30 | Stop reason: HOSPADM

## 2023-03-27 RX ORDER — FAMOTIDINE 10 MG/ML
20 INJECTION, SOLUTION INTRAVENOUS EVERY 12 HOURS SCHEDULED
Status: DISCONTINUED | OUTPATIENT
Start: 2023-03-27 | End: 2023-03-30 | Stop reason: HOSPADM

## 2023-03-27 RX ORDER — LIDOCAINE HYDROCHLORIDE 10 MG/ML
INJECTION, SOLUTION EPIDURAL; INFILTRATION; INTRACAUDAL; PERINEURAL AS NEEDED
Status: DISCONTINUED | OUTPATIENT
Start: 2023-03-27 | End: 2023-03-27 | Stop reason: SURG

## 2023-03-27 RX ORDER — SODIUM CHLORIDE 0.9 % (FLUSH) 0.9 %
10 SYRINGE (ML) INJECTION AS NEEDED
Status: DISCONTINUED | OUTPATIENT
Start: 2023-03-27 | End: 2023-03-30 | Stop reason: HOSPADM

## 2023-03-27 RX ORDER — DEXMEDETOMIDINE HYDROCHLORIDE 100 UG/ML
INJECTION, SOLUTION INTRAVENOUS AS NEEDED
Status: DISCONTINUED | OUTPATIENT
Start: 2023-03-27 | End: 2023-03-27 | Stop reason: SURG

## 2023-03-27 RX ORDER — FENTANYL CITRATE 50 UG/ML
50 INJECTION, SOLUTION INTRAMUSCULAR; INTRAVENOUS
Status: DISCONTINUED | OUTPATIENT
Start: 2023-03-27 | End: 2023-03-27 | Stop reason: HOSPADM

## 2023-03-27 RX ORDER — DIPHENHYDRAMINE HCL 25 MG
25 CAPSULE ORAL NIGHTLY PRN
Status: DISCONTINUED | OUTPATIENT
Start: 2023-03-27 | End: 2023-03-30 | Stop reason: HOSPADM

## 2023-03-27 RX ORDER — OXYCODONE AND ACETAMINOPHEN 7.5; 325 MG/1; MG/1
1 TABLET ORAL EVERY 4 HOURS PRN
Status: DISCONTINUED | OUTPATIENT
Start: 2023-03-27 | End: 2023-03-30 | Stop reason: HOSPADM

## 2023-03-27 RX ORDER — SODIUM CHLORIDE, SODIUM LACTATE, POTASSIUM CHLORIDE, CALCIUM CHLORIDE 600; 310; 30; 20 MG/100ML; MG/100ML; MG/100ML; MG/100ML
9 INJECTION, SOLUTION INTRAVENOUS CONTINUOUS
Status: DISCONTINUED | OUTPATIENT
Start: 2023-03-27 | End: 2023-03-30 | Stop reason: HOSPADM

## 2023-03-27 RX ORDER — CEFAZOLIN SODIUM 2 G/100ML
2 INJECTION, SOLUTION INTRAVENOUS EVERY 8 HOURS
Status: COMPLETED | OUTPATIENT
Start: 2023-03-27 | End: 2023-03-28

## 2023-03-27 RX ORDER — DOCUSATE SODIUM 100 MG/1
100 CAPSULE, LIQUID FILLED ORAL 2 TIMES DAILY PRN
Status: DISCONTINUED | OUTPATIENT
Start: 2023-03-27 | End: 2023-03-30 | Stop reason: HOSPADM

## 2023-03-27 RX ORDER — ROCURONIUM BROMIDE 10 MG/ML
INJECTION, SOLUTION INTRAVENOUS AS NEEDED
Status: DISCONTINUED | OUTPATIENT
Start: 2023-03-27 | End: 2023-03-27 | Stop reason: SURG

## 2023-03-27 RX ORDER — BUPIVACAINE HYDROCHLORIDE AND EPINEPHRINE 2.5; 5 MG/ML; UG/ML
INJECTION, SOLUTION EPIDURAL; INFILTRATION; INTRACAUDAL; PERINEURAL AS NEEDED
Status: DISCONTINUED | OUTPATIENT
Start: 2023-03-27 | End: 2023-03-27 | Stop reason: HOSPADM

## 2023-03-27 RX ORDER — LIDOCAINE HYDROCHLORIDE 10 MG/ML
0.5 INJECTION, SOLUTION EPIDURAL; INFILTRATION; INTRACAUDAL; PERINEURAL ONCE AS NEEDED
Status: COMPLETED | OUTPATIENT
Start: 2023-03-27 | End: 2023-03-27

## 2023-03-27 RX ORDER — MAGNESIUM HYDROXIDE 1200 MG/15ML
LIQUID ORAL AS NEEDED
Status: DISCONTINUED | OUTPATIENT
Start: 2023-03-27 | End: 2023-03-27 | Stop reason: HOSPADM

## 2023-03-27 RX ORDER — IBUPROFEN 800 MG/1
800 TABLET ORAL ONCE
Status: COMPLETED | OUTPATIENT
Start: 2023-03-27 | End: 2023-03-27

## 2023-03-27 RX ORDER — PROPOFOL 10 MG/ML
VIAL (ML) INTRAVENOUS AS NEEDED
Status: DISCONTINUED | OUTPATIENT
Start: 2023-03-27 | End: 2023-03-27 | Stop reason: SURG

## 2023-03-27 RX ORDER — FENTANYL CITRATE 50 UG/ML
INJECTION, SOLUTION INTRAMUSCULAR; INTRAVENOUS AS NEEDED
Status: DISCONTINUED | OUTPATIENT
Start: 2023-03-27 | End: 2023-03-27 | Stop reason: SURG

## 2023-03-27 RX ORDER — ACETAMINOPHEN 325 MG/1
650 TABLET ORAL EVERY 8 HOURS SCHEDULED
Status: DISCONTINUED | OUTPATIENT
Start: 2023-03-27 | End: 2023-03-30 | Stop reason: HOSPADM

## 2023-03-27 RX ORDER — PROMETHAZINE HYDROCHLORIDE 12.5 MG/1
12.5 SUPPOSITORY RECTAL EVERY 6 HOURS PRN
Status: DISCONTINUED | OUTPATIENT
Start: 2023-03-27 | End: 2023-03-30 | Stop reason: HOSPADM

## 2023-03-27 RX ORDER — MIDAZOLAM HYDROCHLORIDE 1 MG/ML
0.5 INJECTION INTRAMUSCULAR; INTRAVENOUS
Status: DISCONTINUED | OUTPATIENT
Start: 2023-03-27 | End: 2023-03-27 | Stop reason: HOSPADM

## 2023-03-27 RX ORDER — PROMETHAZINE HYDROCHLORIDE 12.5 MG/1
12.5 TABLET ORAL EVERY 6 HOURS PRN
Status: DISCONTINUED | OUTPATIENT
Start: 2023-03-27 | End: 2023-03-30 | Stop reason: HOSPADM

## 2023-03-27 RX ORDER — FAMOTIDINE 20 MG/1
20 TABLET, FILM COATED ORAL
Status: COMPLETED | OUTPATIENT
Start: 2023-03-27 | End: 2023-03-27

## 2023-03-27 RX ORDER — AMOXICILLIN 250 MG
2 CAPSULE ORAL NIGHTLY PRN
Status: DISCONTINUED | OUTPATIENT
Start: 2023-03-27 | End: 2023-03-30 | Stop reason: HOSPADM

## 2023-03-27 RX ORDER — VANCOMYCIN HYDROCHLORIDE 1 G/20ML
INJECTION, POWDER, LYOPHILIZED, FOR SOLUTION INTRAVENOUS AS NEEDED
Status: DISCONTINUED | OUTPATIENT
Start: 2023-03-27 | End: 2023-03-27 | Stop reason: HOSPADM

## 2023-03-27 RX ORDER — HYDROCHLOROTHIAZIDE 12.5 MG/1
12.5 TABLET ORAL
Status: DISCONTINUED | OUTPATIENT
Start: 2023-03-28 | End: 2023-03-30 | Stop reason: HOSPADM

## 2023-03-27 RX ORDER — MORPHINE SULFATE 2 MG/ML
2 INJECTION, SOLUTION INTRAMUSCULAR; INTRAVENOUS EVERY 4 HOURS PRN
Status: DISCONTINUED | OUTPATIENT
Start: 2023-03-27 | End: 2023-03-30 | Stop reason: HOSPADM

## 2023-03-27 RX ORDER — SODIUM CHLORIDE 9 MG/ML
40 INJECTION, SOLUTION INTRAVENOUS AS NEEDED
Status: DISCONTINUED | OUTPATIENT
Start: 2023-03-27 | End: 2023-03-30 | Stop reason: HOSPADM

## 2023-03-27 RX ORDER — ONDANSETRON 2 MG/ML
INJECTION INTRAMUSCULAR; INTRAVENOUS AS NEEDED
Status: DISCONTINUED | OUTPATIENT
Start: 2023-03-27 | End: 2023-03-27 | Stop reason: SURG

## 2023-03-27 RX ORDER — SODIUM CHLORIDE 9 MG/ML
INJECTION, SOLUTION INTRAVENOUS AS NEEDED
Status: DISCONTINUED | OUTPATIENT
Start: 2023-03-27 | End: 2023-03-27 | Stop reason: HOSPADM

## 2023-03-27 RX ORDER — ACETAMINOPHEN 500 MG
1000 TABLET ORAL ONCE
Status: COMPLETED | OUTPATIENT
Start: 2023-03-27 | End: 2023-03-27

## 2023-03-27 RX ORDER — DEXAMETHASONE SODIUM PHOSPHATE 4 MG/ML
INJECTION, SOLUTION INTRA-ARTICULAR; INTRALESIONAL; INTRAMUSCULAR; INTRAVENOUS; SOFT TISSUE AS NEEDED
Status: DISCONTINUED | OUTPATIENT
Start: 2023-03-27 | End: 2023-03-27 | Stop reason: SURG

## 2023-03-27 RX ORDER — METOPROLOL TARTRATE 50 MG/1
50 TABLET, FILM COATED ORAL 2 TIMES DAILY
Status: DISCONTINUED | OUTPATIENT
Start: 2023-03-27 | End: 2023-03-30 | Stop reason: HOSPADM

## 2023-03-27 RX ORDER — CLONIDINE HYDROCHLORIDE 0.1 MG/1
0.1 TABLET ORAL EVERY 12 HOURS PRN
Status: DISCONTINUED | OUTPATIENT
Start: 2023-03-27 | End: 2023-03-30 | Stop reason: HOSPADM

## 2023-03-27 RX ORDER — ONDANSETRON 4 MG/1
4 TABLET, FILM COATED ORAL EVERY 6 HOURS PRN
Status: DISCONTINUED | OUTPATIENT
Start: 2023-03-27 | End: 2023-03-30 | Stop reason: HOSPADM

## 2023-03-27 RX ORDER — MORPHINE SULFATE 4 MG/ML
5 INJECTION, SOLUTION INTRAMUSCULAR; INTRAVENOUS EVERY 4 HOURS PRN
Status: DISCONTINUED | OUTPATIENT
Start: 2023-03-27 | End: 2023-03-30 | Stop reason: HOSPADM

## 2023-03-27 RX ORDER — ONDANSETRON 2 MG/ML
4 INJECTION INTRAMUSCULAR; INTRAVENOUS EVERY 6 HOURS PRN
Status: DISCONTINUED | OUTPATIENT
Start: 2023-03-27 | End: 2023-03-30 | Stop reason: HOSPADM

## 2023-03-27 RX ORDER — SODIUM CHLORIDE, SODIUM LACTATE, POTASSIUM CHLORIDE, CALCIUM CHLORIDE 600; 310; 30; 20 MG/100ML; MG/100ML; MG/100ML; MG/100ML
90 INJECTION, SOLUTION INTRAVENOUS CONTINUOUS
Status: DISCONTINUED | OUTPATIENT
Start: 2023-03-27 | End: 2023-03-28

## 2023-03-27 RX ORDER — BISACODYL 10 MG
10 SUPPOSITORY, RECTAL RECTAL DAILY PRN
Status: DISCONTINUED | OUTPATIENT
Start: 2023-03-27 | End: 2023-03-30 | Stop reason: HOSPADM

## 2023-03-27 RX ORDER — FAMOTIDINE 20 MG/1
20 TABLET, FILM COATED ORAL EVERY 12 HOURS SCHEDULED
Status: DISCONTINUED | OUTPATIENT
Start: 2023-03-27 | End: 2023-03-30 | Stop reason: HOSPADM

## 2023-03-27 RX ORDER — CEFAZOLIN SODIUM 2 G/100ML
2 INJECTION, SOLUTION INTRAVENOUS ONCE
Status: COMPLETED | OUTPATIENT
Start: 2023-03-27 | End: 2023-03-27

## 2023-03-27 RX ORDER — SODIUM CHLORIDE 0.9 % (FLUSH) 0.9 %
10 SYRINGE (ML) INJECTION EVERY 12 HOURS SCHEDULED
Status: DISCONTINUED | OUTPATIENT
Start: 2023-03-27 | End: 2023-03-30 | Stop reason: HOSPADM

## 2023-03-27 RX ORDER — LISINOPRIL 10 MG/1
10 TABLET ORAL
Status: DISCONTINUED | OUTPATIENT
Start: 2023-03-28 | End: 2023-03-30 | Stop reason: HOSPADM

## 2023-03-27 RX ORDER — EPHEDRINE SULFATE 50 MG/ML
INJECTION INTRAVENOUS AS NEEDED
Status: DISCONTINUED | OUTPATIENT
Start: 2023-03-27 | End: 2023-03-27 | Stop reason: SURG

## 2023-03-27 RX ORDER — PHENYLEPHRINE HCL IN 0.9% NACL 1 MG/10 ML
SYRINGE (ML) INTRAVENOUS AS NEEDED
Status: DISCONTINUED | OUTPATIENT
Start: 2023-03-27 | End: 2023-03-27 | Stop reason: SURG

## 2023-03-27 RX ADMIN — Medication 2 G: at 23:47

## 2023-03-27 RX ADMIN — SODIUM CHLORIDE, POTASSIUM CHLORIDE, SODIUM LACTATE AND CALCIUM CHLORIDE 9 ML/HR: 600; 310; 30; 20 INJECTION, SOLUTION INTRAVENOUS at 05:45

## 2023-03-27 RX ADMIN — ACETAMINOPHEN 1000 MG: 500 TABLET ORAL at 06:04

## 2023-03-27 RX ADMIN — Medication 50 MCG: at 07:29

## 2023-03-27 RX ADMIN — IBUPROFEN 800 MG: 800 TABLET, FILM COATED ORAL at 06:04

## 2023-03-27 RX ADMIN — FAMOTIDINE 20 MG: 20 TABLET ORAL at 20:33

## 2023-03-27 RX ADMIN — Medication 50 MCG: at 07:46

## 2023-03-27 RX ADMIN — LIDOCAINE HYDROCHLORIDE 100 MG: 10 INJECTION, SOLUTION EPIDURAL; INFILTRATION; INTRACAUDAL; PERINEURAL at 06:54

## 2023-03-27 RX ADMIN — ONDANSETRON 4 MG: 2 INJECTION INTRAMUSCULAR; INTRAVENOUS at 09:39

## 2023-03-27 RX ADMIN — FENTANYL CITRATE 50 MCG: 50 INJECTION, SOLUTION INTRAMUSCULAR; INTRAVENOUS at 07:04

## 2023-03-27 RX ADMIN — EPHEDRINE SULFATE 5 MG: 50 INJECTION INTRAVENOUS at 08:13

## 2023-03-27 RX ADMIN — EPHEDRINE SULFATE 5 MG: 50 INJECTION INTRAVENOUS at 07:46

## 2023-03-27 RX ADMIN — ACETAMINOPHEN 325MG 650 MG: 325 TABLET ORAL at 14:47

## 2023-03-27 RX ADMIN — FENTANYL CITRATE 50 MCG: 50 INJECTION, SOLUTION INTRAMUSCULAR; INTRAVENOUS at 06:54

## 2023-03-27 RX ADMIN — ASPIRIN 81 MG 81 MG: 81 TABLET ORAL at 11:44

## 2023-03-27 RX ADMIN — Medication 50 MCG: at 09:20

## 2023-03-27 RX ADMIN — OXYCODONE HYDROCHLORIDE 10 MG: 10 TABLET, FILM COATED, EXTENDED RELEASE ORAL at 06:04

## 2023-03-27 RX ADMIN — DIPHENHYDRAMINE HYDROCHLORIDE 25 MG: 25 CAPSULE ORAL at 21:34

## 2023-03-27 RX ADMIN — DEXAMETHASONE SODIUM PHOSPHATE 4 MG: 4 INJECTION, SOLUTION INTRAMUSCULAR; INTRAVENOUS at 07:02

## 2023-03-27 RX ADMIN — EPHEDRINE SULFATE 5 MG: 50 INJECTION INTRAVENOUS at 08:41

## 2023-03-27 RX ADMIN — ROCURONIUM BROMIDE 70 MG: 10 INJECTION INTRAVENOUS at 06:54

## 2023-03-27 RX ADMIN — ROCURONIUM BROMIDE 10 MG: 10 INJECTION INTRAVENOUS at 09:30

## 2023-03-27 RX ADMIN — DEXMEDETOMIDINE HYDROCHLORIDE 4 MCG: 100 INJECTION, SOLUTION INTRAVENOUS at 09:36

## 2023-03-27 RX ADMIN — DEXMEDETOMIDINE HYDROCHLORIDE 8 MCG: 100 INJECTION, SOLUTION INTRAVENOUS at 07:02

## 2023-03-27 RX ADMIN — Medication 2 G: at 16:09

## 2023-03-27 RX ADMIN — Medication 50 MCG: at 08:41

## 2023-03-27 RX ADMIN — OXYCODONE HYDROCHLORIDE AND ACETAMINOPHEN 1 TABLET: 7.5; 325 TABLET ORAL at 21:34

## 2023-03-27 RX ADMIN — SODIUM CHLORIDE, POTASSIUM CHLORIDE, SODIUM LACTATE AND CALCIUM CHLORIDE 90 ML/HR: 600; 310; 30; 20 INJECTION, SOLUTION INTRAVENOUS at 11:07

## 2023-03-27 RX ADMIN — CEFAZOLIN SODIUM 2 G: 2 INJECTION, SOLUTION INTRAVENOUS at 07:00

## 2023-03-27 RX ADMIN — PROPOFOL 200 MG: 10 INJECTION, EMULSION INTRAVENOUS at 06:54

## 2023-03-27 RX ADMIN — ROCURONIUM BROMIDE 15 MG: 10 INJECTION INTRAVENOUS at 08:45

## 2023-03-27 RX ADMIN — FAMOTIDINE 20 MG: 20 TABLET ORAL at 06:05

## 2023-03-27 RX ADMIN — LIDOCAINE HYDROCHLORIDE 0.2 ML: 10 INJECTION, SOLUTION EPIDURAL; INFILTRATION; INTRACAUDAL; PERINEURAL at 05:45

## 2023-03-27 RX ADMIN — Medication 50 MCG: at 08:13

## 2023-03-27 RX ADMIN — OXYCODONE HYDROCHLORIDE AND ACETAMINOPHEN 1 TABLET: 7.5; 325 TABLET ORAL at 11:45

## 2023-03-27 RX ADMIN — EPHEDRINE SULFATE 10 MG: 50 INJECTION INTRAVENOUS at 07:28

## 2023-03-27 RX ADMIN — METOPROLOL TARTRATE 50 MG: 50 TABLET ORAL at 11:45

## 2023-03-27 RX ADMIN — ROCURONIUM BROMIDE 15 MG: 10 INJECTION INTRAVENOUS at 07:55

## 2023-03-27 RX ADMIN — EPHEDRINE SULFATE 5 MG: 50 INJECTION INTRAVENOUS at 09:20

## 2023-03-27 RX ADMIN — ACETAMINOPHEN 325MG 650 MG: 325 TABLET ORAL at 21:34

## 2023-03-27 RX ADMIN — SUGAMMADEX 200 MG: 100 INJECTION, SOLUTION INTRAVENOUS at 09:49

## 2023-03-27 RX ADMIN — DEXMEDETOMIDINE HYDROCHLORIDE 8 MCG: 100 INJECTION, SOLUTION INTRAVENOUS at 08:59

## 2023-03-27 RX ADMIN — METOPROLOL TARTRATE 50 MG: 50 TABLET ORAL at 20:33

## 2023-03-27 NOTE — ANESTHESIA PREPROCEDURE EVALUATION
Anesthesia Evaluation     Patient summary reviewed and Nursing notes reviewed   history of anesthetic complications: PONV               Airway   Mallampati: II  TM distance: >3 FB  Neck ROM: full  No difficulty expected  Dental - normal exam   (+) upper dentures    Pulmonary - negative pulmonary ROS and normal exam   Cardiovascular - normal exam    (+) hypertension, dysrhythmias PVC, hyperlipidemia,       Neuro/Psych- negative ROS  GI/Hepatic/Renal/Endo    (+)  GERD,  renal disease CRI, diabetes mellitus,     Musculoskeletal     (+) back pain,   Abdominal  - normal exam    Bowel sounds: normal.   Substance History - negative use     OB/GYN negative ob/gyn ROS         Other   arthritis,    history of cancer (PROSTATE)                  Anesthesia Plan    ASA 3     general     intravenous induction     Anesthetic plan, risks, benefits, and alternatives have been provided, discussed and informed consent has been obtained with: patient.    Plan discussed with CRNA.        CODE STATUS:

## 2023-03-27 NOTE — INTERVAL H&P NOTE
"[facility] Pre-op    Full history and physical note from office is attached.    BP (!) 196/91 (BP Location: Right arm, Patient Position: Sitting)   Pulse 70   Temp 97.3 °F (36.3 °C) (Temporal)   Resp 18   Ht 180.3 cm (71\")   Wt 92.1 kg (203 lb)   SpO2 97%   BMI 28.31 kg/m²     Immunizations:  Influenza:  2022  Pneumococcal:  UTD  Tetanus:  Unknown  Covid : x3      LAB Results:  Lab Results   Component Value Date    WBC 7.21 03/21/2023    HGB 17.2 03/21/2023    HCT 50.0 03/21/2023    MCV 96.7 03/21/2023     03/21/2023    K 4.5 03/21/2023 2/28/23 CT lumbar:  Findings:   There is no evidence of fracture or suspicious marrow replacing lesion. There is mild retrolisthesis of L3 on L4. Alignment is otherwise anatomic, without evidence of significant listhesis or subluxation. The conus medullaris and cauda equina nerve roots   are satisfactory in appearance. The paraspinal soft tissues demonstrate no acute or suspicious findings. Multilevel spondylosis is present, with areas of involvement including     L1-2, no significant spinal canal or neuroforaminal impingement.     L2-3, circumferential disc bulge and facet arthropathy, including a prominent osteophyte projecting towards the spinal canal from the inferior articulating facet of L2. There is moderate to severe narrowing of the left aspect of the spinal canal with   mild right and moderate left neuroforaminal narrowing present.     L3-4, small disc bulge and bilateral facet arthropathy. There is mild spinal canal narrowing and moderate bilateral neuroforaminal stenosis.     L4-5, circumstantial disc bulge and bilateral facet arthropathy. There is mild spinal canal narrowing. There is severe bilateral neuroforaminal stenosis.     L5-S1, small disc bulge and bilateral facet arthropathy. There is no significant spinal canal stenosis. There is mild bilateral neural foraminal narrowing, fairly symmetric.     IMPRESSION:  Impression:    " Multilevel lumbar spondylosis is present, overall similar appearance to outside comparison MRI. There is no evidence of high-grade spinal canal narrowing. Moderate to severe multilevel neuroforaminal stenosis is present, most notable at L3-4 and L4-5.    Cancer Staging (if applicable)  Cancer Patient: __ yes __no __unknown__N/A; If yes, clinical stage T:__ N:__M:__, stage group or __N/A      Impression: Spondylolisthesis       Plan: LUMBAR FUSION DECOMPRESSON WITH PEDICLE SCREWS L5-S1      Christiane Frost, ISAIAH   3/27/2023   06:38 EDT

## 2023-03-27 NOTE — OP NOTE
NEUROSURGICAL OPERATIVE NOTE        PREOPERATIVE DIAGNOSIS:    L5-S1 spondylolisthesis and stenosis with radiculopathy      POSTOPERATIVE DIAGNOSIS:  Same      PROCEDURE:  1.  Arthrodesis interbody type L5-S1  2.  L5-S1 laminectomy with partial facetectomies and foraminotomies  3.  Bilateral L5-S1 discectomy with bilateral Adaptix cage placement  4.  Nonsegmental pedicle screw fixation L5-S1 utilizing Solera PEEK  5.  Use of Gilbert local autograft  6.  Stealth frameless stereotaxy utilized in conjunction with O arm imaging      SURGEON:  Lenin Sood M.D.      ASSISTANT: Luis Blevins PA-C    PAC assisted with:   Suctioning   Retraction   Tying   Suturing   Closing   Application of dressing   Skilled neurosurgery PA assistance was necessary to perform this procedure.        ANESTHESIA:  General      ESTIMATED BLOOD LOSS: 100 mL      SPECIMEN: None      DRAINS: Hemovac      COMPLICATIONS:  None      Spinal Surgery Levels Completed:1 Level        CLINICAL NOTE:  The patient is a 77-year-old gentleman with progressive back and right lower extremity pain.  Studies demonstrate spondylolisthesis at L5-S1 with severe biforaminal compromise particularly on the right.  As such, he presents at this time for L5-S1 decompression with fusion and stabilization.  The nature of the procedure as well as the potential risks, complications, limitations, and alternatives to the procedure were discussed at length with the patient and the patient has agreed to proceed with surgery.      TECHNICAL NOTE:  The patient was brought to the operating room and while on his cart general endotracheal anesthesia was achieved. He was then turned prone onto the Ras table. Special care was taken to protect pressure points. His low back was prepared and draped in the usual fashion. A localizing radiograph was obtained with a spinal needle in the lumbosacral midline. Based on this, a several centimeter vertical incision was fashioned.  Underlying tissues were divided with cautery to provide exposure to the posterior spinal elements from L5 to S1. Reference frame was affixed to a spinous process, O-arm imaging ensued and these imaging were downloaded into the stealth station. Using Stealth frameless stereotaxy each of the pedicle screw hole sites at L5 and S1 bilaterally were marked, drilled, and tapped. 6.5 mm diameter screws were utilized throughout. Screws on the left were 45 mm in length and screws on the right at both levels were 50 mm in length. Leksell rongeur was then utilized to move the spinous process at L5 in the upper aspect of S1. Facet complexes were subtotally resected. The L5 and S1 nerve roots were decompressed. The C-arm was brought into use. The disc space was very collapsed at L5-S1. There was also osteophyte overlying the disc space. A hand held osteotome was utilized to fracture osteophyte overlying the disc space on the right. I was able to mobilize some of the osteophyte and much of this osteophyte was responsible for compression of the L5 nerve root. This osteophyte was mobilized, removed with drill and Kerrison punches. This allowed for good decompression of the L5 nerve root. Disc was removed and serial impactors were impacted in the disc space. Ultimately a 10 x 24 mm Adaptix cage was impacted into the disc space using fluoroscopic guidance. This cage had been packed with a combination of [place] and local autograft. Attention was turned to the contralateral side where the disc space was also prepared prior to placing the second 10 x 24 mm Adaptix cage. Some of the fusion substrate anterior and in the midline. After placing the second cage, nerve roots were inspected. The L5 and S1 roots were well decompressed on each side. PEEK rods measuring 45 mm on the right and 40 mm on the left were applied to the screw head. Set screws were applied, tightened, and broken per routine. The wound was washed out with saline solution.  Nerve roots were once again observed to be intact and well decompressed. Gelfoam was left in the gutters. A Hemovac drain was brought in through a separate stab incision and left in the epidural space. Vancomycin powder was sprinkled in the depths and more superficially as the wound was closed. The paraspinous muscle and fascia were reapproximated in an interrupted fashion with 0 Vicryl suture. 0.25% Marcaine was instilled in the paraspinous musculature and subcutaneous tissues. The subcutaneous tissues were closed in layers with 2-0 followed by 3-0 Vicryl suture. The skin was closed in a running subcuticular fashion with 3-0 Vicryl suture. Steri-Strips and sterile dressing was applied. The patient was rolled onto his cart, extubated and taken to the recovery room in satisfactory condition.            Lenin Sood M.D.

## 2023-03-27 NOTE — ANESTHESIA POSTPROCEDURE EVALUATION
Patient: Barndon Brooks    Procedure Summary     Date: 03/27/23 Room / Location:  [facility] /  [facility]    Anesthesia Start: 0650 Anesthesia Stop: 1010    Procedure: LUMBAR FUSION DECOMPRESSON WITH PEDICLE SCREWS L5-S1 (Spine Lumbar) Diagnosis:       Spondylolisthesis, acquired      (Spondylolisthesis, acquired [M43.10])    Surgeons: Lenin Sood MD Provider: Bg Neumann MD    Anesthesia Type: general ASA Status: 3          Anesthesia Type: general    Vitals  Vitals Value Taken Time   /63 03/27/23 1010   Temp 97 °F (36.1 °C) 03/27/23 1010   Pulse 87 03/27/23 1010   Resp 14 03/27/23 1010   SpO2 94 % 03/27/23 1010           Post Anesthesia Care and Evaluation    Patient location during evaluation: PACU  Patient participation: complete - patient participated  Level of consciousness: awake and alert  Pain score: 0  Pain management: adequate    Airway patency: patent  Anesthetic complications: No anesthetic complications  PONV Status: none  Cardiovascular status: hemodynamically stable and acceptable  Respiratory status: nonlabored ventilation, acceptable and nasal cannula  Hydration status: acceptable    Comments: Pt arrived to PACU with no issues during transport. Pt placed directly to monitors. Vital signs are within parameters. Pt maintaining ventilation spontaneously. No changes to dental. Report given to PACU and all question and concerns were addressed as well as the plan of care.

## 2023-03-27 NOTE — ANESTHESIA PROCEDURE NOTES
Airway  Urgency: elective    Date/Time: 3/27/2023 6:57 AM  Airway not difficult    General Information and Staff    Patient location during procedure: OR  CRNA/CAA: Zion Pineda CRNA    Indications and Patient Condition  Indications for airway management: airway protection    Preoxygenated: yes  MILS not maintained throughout  Mask difficulty assessment: 1 - vent by mask    Final Airway Details  Final airway type: endotracheal airway      Successful airway: ETT  Cuffed: yes   Successful intubation technique: direct laryngoscopy  Endotracheal tube insertion site: oral  Blade: Vicky  Blade size: 4  ETT size (mm): 7.5  Cormack-Lehane Classification: grade I - full view of glottis  Placement verified by: chest auscultation and capnometry   Cuff volume (mL): 10  Measured from: lips  ETT/EBT  to lips (cm): 24  Number of attempts at approach: 1  Assessment: lips, teeth, and gum same as pre-op and atraumatic intubation    Additional Comments  Negative epigastric sounds, Breath sound equal bilaterally with symmetric chest rise and fall

## 2023-03-28 LAB
HCT VFR BLD AUTO: 41 % (ref 37.5–51)
HGB BLD-MCNC: 14.1 G/DL (ref 13–17.7)

## 2023-03-28 PROCEDURE — 97165 OT EVAL LOW COMPLEX 30 MIN: CPT | Performed by: OCCUPATIONAL THERAPIST

## 2023-03-28 PROCEDURE — 63710000001 METOPROLOL TARTRATE 50 MG TABLET: Performed by: NEUROLOGICAL SURGERY

## 2023-03-28 PROCEDURE — A9270 NON-COVERED ITEM OR SERVICE: HCPCS | Performed by: NEUROLOGICAL SURGERY

## 2023-03-28 PROCEDURE — 97162 PT EVAL MOD COMPLEX 30 MIN: CPT

## 2023-03-28 PROCEDURE — 63710000001 ASPIRIN 81 MG CHEWABLE TABLET: Performed by: NEUROLOGICAL SURGERY

## 2023-03-28 PROCEDURE — 99024 POSTOP FOLLOW-UP VISIT: CPT | Performed by: NEUROLOGICAL SURGERY

## 2023-03-28 PROCEDURE — 97110 THERAPEUTIC EXERCISES: CPT

## 2023-03-28 PROCEDURE — 97535 SELF CARE MNGMENT TRAINING: CPT | Performed by: OCCUPATIONAL THERAPIST

## 2023-03-28 PROCEDURE — 85014 HEMATOCRIT: CPT | Performed by: NEUROLOGICAL SURGERY

## 2023-03-28 PROCEDURE — 63710000001 LISINOPRIL 10 MG TABLET: Performed by: NEUROLOGICAL SURGERY

## 2023-03-28 PROCEDURE — 63710000001 DOCUSATE SODIUM 100 MG CAPSULE: Performed by: NEUROLOGICAL SURGERY

## 2023-03-28 PROCEDURE — 85018 HEMOGLOBIN: CPT | Performed by: NEUROLOGICAL SURGERY

## 2023-03-28 PROCEDURE — 63710000001 FAMOTIDINE 20 MG TABLET: Performed by: NEUROLOGICAL SURGERY

## 2023-03-28 PROCEDURE — 63710000001 DIPHENHYDRAMINE PER 50 MG: Performed by: NEUROLOGICAL SURGERY

## 2023-03-28 PROCEDURE — 63710000001 HYDROCHLOROTHIAZIDE 12.5 MG CAPSULE: Performed by: NEUROLOGICAL SURGERY

## 2023-03-28 PROCEDURE — G0378 HOSPITAL OBSERVATION PER HR: HCPCS

## 2023-03-28 PROCEDURE — 63710000001 ACETAMINOPHEN 325 MG TABLET: Performed by: NEUROLOGICAL SURGERY

## 2023-03-28 PROCEDURE — 97116 GAIT TRAINING THERAPY: CPT

## 2023-03-28 RX ADMIN — ASPIRIN 81 MG 81 MG: 81 TABLET ORAL at 09:17

## 2023-03-28 RX ADMIN — ACETAMINOPHEN 325MG 650 MG: 325 TABLET ORAL at 13:41

## 2023-03-28 RX ADMIN — ACETAMINOPHEN 325MG 650 MG: 325 TABLET ORAL at 05:05

## 2023-03-28 RX ADMIN — METOPROLOL TARTRATE 25 MG: 50 TABLET ORAL at 20:16

## 2023-03-28 RX ADMIN — Medication 10 ML: at 09:21

## 2023-03-28 RX ADMIN — METOPROLOL TARTRATE 50 MG: 50 TABLET ORAL at 09:17

## 2023-03-28 RX ADMIN — Medication 10 ML: at 20:16

## 2023-03-28 RX ADMIN — DOCUSATE SODIUM 100 MG: 100 CAPSULE, LIQUID FILLED ORAL at 20:15

## 2023-03-28 RX ADMIN — FAMOTIDINE 20 MG: 20 TABLET ORAL at 20:15

## 2023-03-28 RX ADMIN — FAMOTIDINE 20 MG: 20 TABLET ORAL at 09:17

## 2023-03-28 RX ADMIN — DIPHENHYDRAMINE HYDROCHLORIDE 25 MG: 25 CAPSULE ORAL at 20:31

## 2023-03-28 RX ADMIN — LISINOPRIL 10 MG: 10 TABLET ORAL at 09:18

## 2023-03-28 RX ADMIN — HYDROCHLOROTHIAZIDE 12.5 MG: 12.5 CAPSULE ORAL at 09:18

## 2023-03-28 RX ADMIN — ACETAMINOPHEN 325MG 650 MG: 325 TABLET ORAL at 20:15

## 2023-03-28 RX ADMIN — SODIUM CHLORIDE, POTASSIUM CHLORIDE, SODIUM LACTATE AND CALCIUM CHLORIDE 90 ML/HR: 600; 310; 30; 20 INJECTION, SOLUTION INTRAVENOUS at 05:06

## 2023-03-28 NOTE — THERAPY EVALUATION
Patient Name: Brandon Brooks  : 1945    MRN: [id number]                              Today's Date: 3/28/2023       Admit Date: 3/27/2023    Visit Dx:     ICD-10-CM ICD-9-CM   1. Spondylolisthesis, acquired  M43.10 738.4     Patient Active Problem List   Diagnosis   • Arthritis   • CKD (chronic kidney disease)   • Diabetes (HCC)   • Fatigue   • HLD (hyperlipidemia)   • Hypertension   • Lumbar radiculopathy   • Palpitations   • PVC (premature ventricular contraction)   • Spondylolisthesis, acquired     Past Medical History:   Diagnosis Date   • Arthritis    • Cancer (HCC)     prostate cancer   • GERD (gastroesophageal reflux disease)    • Hearing loss    • Hypertension    • Irregular heart beat    • Low back pain    • PONV (postoperative nausea and vomiting)      Past Surgical History:   Procedure Laterality Date   • CATARACT EXTRACTION Bilateral    • HERNIA REPAIR     • LUMBAR DISC SURGERY      Dr cameron   • PROSTATECTOMY        General Information     Row Name 23 1035          OT Time and Intention    Document Type evaluation  -AR     Mode of Treatment individual therapy;occupational therapy  -AR     Row Name 23 1035          General Information    Patient Profile Reviewed yes  -AR     Prior Level of Function min assist:;all household mobility;community mobility;gait;transfer;ADL's  limited with pain  -AR     Existing Precautions/Restrictions spinal;fall  dizziness with elevated BP  -AR     Barriers to Rehab none identified  -AR     Row Name 23 1035          Living Environment    People in Home spouse  -AR     Row Name 23 1035          Home Main Entrance    Number of Stairs, Main Entrance one  -AR     Stair Railings, Main Entrance none  -AR     Row Name 23 1035          Stairs Within Home, Primary    Number of Stairs, Within Home, Primary other (see comments)  14  -AR     Stair Railings, Within Home, Primary railing on left side (ascending)  -AR     Stairs Comment, Within  Home, Primary Pt has walk-in shower and standard tub  -AR     Row Name 03/28/23 1035          Cognition    Orientation Status (Cognition) oriented x 4  -AR     Row Name 03/28/23 1035          Safety Issues, Functional Mobility    Safety Issues Affecting Function (Mobility) awareness of need for assistance;safety precaution awareness;safety precautions follow-through/compliance;positioning of assistive device  -AR     Impairments Affecting Function (Mobility) balance;pain;range of motion (ROM);other (see comments)  dizziness  -AR           User Key  (r) = Recorded By, (t) = Taken By, (c) = Cosigned By    Initials Name Provider Type    AR Christiane Grider, OT Occupational Therapist                 Mobility/ADL's     Row Name 03/28/23 1039          Bed Mobility    Bed Mobility scooting/bridging;supine-sit  -AR     Scooting/Bridging [place] (Bed Mobility) supervision  -AR     Supine-Sit [place] (Bed Mobility) supervision  -AR     Comment, (Bed Mobility) Pt able to recall/execute logroll technique  -AR     Row Name 03/28/23 1039          Transfers    Transfers sit-stand transfer;stand-sit transfer;toilet transfer  -AR     Comment, (Transfers) Cues for safe walker placement  -AR     Row Name 03/28/23 1039          Sit-Stand Transfer    Sit-Stand [place] (Transfers) supervision;verbal cues  -AR     Assistive Device (Sit-Stand Transfers) walker, front-wheeled  -AR     Row Name 03/28/23 1039          Stand-Sit Transfer    Stand-Sit [place] (Transfers) supervision;verbal cues  -AR     Assistive Device (Stand-Sit Transfers) walker, front-wheeled  -AR     Row Name 03/28/23 1039          Toilet Transfer    Type (Toilet Transfer) lateral  -AR     [place] Level (Toilet Transfer) minimum assist (75% patient effort);verbal cues  -AR     Assistive Device (Toilet Transfer) walker, front-wheeled  -AR     Comment, (Toilet Transfer) limited with dizziness  -AR     Row Name 03/28/23 1039          Functional  Mobility    Functional Mobility- Ind. Level contact guard assist;verbal cues required  -AR     Functional Mobility- Device walker, front-wheeled  -AR     Row Name 03/28/23 1039          Activities of Daily Living    BADL Assessment/Intervention bathing;lower body dressing;grooming;feeding;toileting  -AR     Row Name 03/28/23 1039          Bathing Assessment/Intervention    Comment, (Bathing) Educated pt on spinal precautions and issued LH sponge to assist  -AR     Row Name 03/28/23 1039          Lower Body Dressing Assessment/Training    [place] Level (Lower Body Dressing) don;doff;contact guard assist;verbal cues  -AR     Assistive Devices (Lower Body Dressing) reacher;sock-aid  -AR     Position (Lower Body Dressing) supported sitting  -AR     Comment, (Lower Body Dressing) Educated pt on spinal precautions and incorporation into ADL routine, pt unable to don socks via cross-leg technique d/t pain limitations. Issued reacher, sock aide and shoe horn and educated on use.  -AR     Row Name 03/28/23 1039          Grooming Assessment/Training    [place] Level (Grooming) wash face, hands;contact guard assist  -AR     Position (Grooming) sink side;supported standing  -AR     Comment, (Grooming) Cues to stay inside walker  -AR     Row Name 03/28/23 1039          Self-Feeding Assessment/Training    [place] Level (Feeding) liquids to mouth;supervision  -AR     Position (Self-Feeding) supine;supported sitting  -AR     Row Name 03/28/23 1039          Toileting Assessment/Training    [place] Level (Toileting) toileting skills;verbal cues;contact guard assist  -AR     Position (Toileting) supported standing  -AR           User Key  (r) = Recorded By, (t) = Taken By, (c) = Cosigned By    Initials Name Provider Type    Christiane Pardo OT Occupational Therapist               Obj/Interventions     Row Name 03/28/23 1043          Sensory Assessment (Somatosensory)    Sensory Assessment (Somatosensory) UE  sensation intact  -AR     Row Name 03/28/23 1043          Vision Assessment/Intervention    Visual Impairment/Limitations WNL  -AR     Row Name 03/28/23 1043          Range of Motion Comprehensive    General Range of Motion bilateral upper extremity ROM WNL  -AR     [facility] Name 03/28/23 1043          Strength Comprehensive (MMT)    Comment, General Manual Muscle Testing (MMT) Assessment BUE WFL for ADLS  -AR     Row Name 03/28/23 1043          Motor Skills    Motor Skills coordination  -AR     Coordination WNL;bilateral;upper extremity  -AR     Row Name 03/28/23 1043          Balance    Balance Assessment sitting static balance;sitting dynamic balance;standing static balance;standing dynamic balance  -AR     Static Sitting Balance independent  -AR     Dynamic Sitting Balance independent  -AR     Position, Sitting Balance unsupported;sitting edge of bed  -AR     Static Standing Balance contact guard  -AR     Dynamic Standing Balance contact guard  -AR     Position/Device Used, Standing Balance unsupported  -AR           User Key  (r) = Recorded By, (t) = Taken By, (c) = Cosigned By    Initials Name Provider Type    Christiane Pardo, MARYAM Occupational Therapist               Goals/Plan     Row Name 03/28/23 1048          Transfer Goal 1 (OT)    Activity/Assistive Device (Transfer Goal 1, OT) sit-to-stand/stand-to-sit;toilet;walker, rolling  -AR     [place] Level/Cues Needed (Transfer Goal 1, OT) supervision required;verbal cues required  -AR     Time Frame (Transfer Goal 1, OT) long term goal (LTG);by discharge  -AR     Progress/Outcome (Transfer Goal 1, OT) goal ongoing  -AR     Row Name 03/28/23 1048          Dressing Goal 1 (OT)    Activity/Device (Dressing Goal 1, OT) lower body dressing;reacher;sock-aid  -AR     [place]/Cues Needed (Dressing Goal 1, OT) supervision required;verbal cues required  -AR     Time Frame (Dressing Goal 1, OT) long term goal (LTG);by discharge  -AR     Progress/Outcome  (Dressing Goal 1, OT) goal ongoing  -AR     Row Name 03/28/23 1048          Problem Specific Goal 1 (OT)    Problem Specific Goal 1 (OT) Pt will recall/maintain spinal precautions during ADL routine with max 3 vc  -AR     Time Frame (Problem Specific Goal 1, OT) long term goal (LTG);by discharge  -AR     Progress/Outcome (Problem Specific Goal 1, OT) goal ongoing  -AR     Row Name 03/28/23 1048          Therapy Assessment/Plan (OT)    Planned Therapy Interventions (OT) adaptive equipment training;BADL retraining;functional balance retraining;IADL retraining;occupation/activity based interventions;patient/caregiver education/training;transfer/mobility retraining  -AR           User Key  (r) = Recorded By, (t) = Taken By, (c) = Cosigned By    Initials Name Provider Type    Christiane Pardo, OT Occupational Therapist               Clinical Impression     Row Name 03/28/23 1044          Pain Assessment    Pretreatment Pain Rating 2/10  -AR     Posttreatment Pain Rating 2/10  -AR     Pain Location incisional  -AR     Pain Location - back  -AR     Pre/Posttreatment Pain Comment pt declined cold pack  -AR     Pain Intervention(s) Medication (See MAR);Repositioned;Ambulation/increased activity  -AR     Row Name 03/28/23 1044          Plan of Care Review    Plan of Care Reviewed With patient  -AR     Outcome Evaluation OT educated pt on spinal precautions and incorporation into ADL routine, issued necessary AE and educated on use. He completed bed mobility with supervision, LB dressing with CGA using AE and transfer training with CGA-supervision d/t c/o dizziness. Recommend DC home with family assist.  -AR     Row Name 03/28/23 1044          Therapy Assessment/Plan (OT)    Rehab Potential (OT) good, to achieve stated therapy goals  -AR     Criteria for Skilled Therapeutic Interventions Met (OT) yes  -AR     Therapy Frequency (OT) daily  -AR     Row Name 03/28/23 1044          Therapy Plan Review/Discharge Plan (OT)     Anticipated Discharge Disposition (OT) home with assist  -AR     Row Name 03/28/23 1044          Vital Signs    Pre Patient Position Supine  -AR     Intra Patient Position Standing  -AR     Post Patient Position Supine  -AR     Row Name 03/28/23 1044          Positioning and Restraints    Pre-Treatment Position in bed  -AR     Post Treatment Position bed  -AR     In Bed supine;notified nsg;call light within reach;encouraged to call for assist;exit alarm on  pt declined SCD and ice, nurse notified of c/o dizziness and elevated BP  -AR           User Key  (r) = Recorded By, (t) = Taken By, (c) = Cosigned By    Initials Name Provider Type    Christiane Pardo, OT Occupational Therapist               Outcome Measures     Row Name 03/28/23 1049          How much help from another is currently needed...    Putting on and taking off regular lower body clothing? 3  -AR     Bathing (including washing, rinsing, and drying) 3  -AR     Toileting (which includes using toilet bed pan or urinal) 3  -AR     Putting on and taking off regular upper body clothing 3  -AR     Taking care of personal grooming (such as brushing teeth) 3  -AR     Eating meals 3  -AR     AM-PAC 6 Clicks Score (OT) 18  -AR     Row Name 03/28/23 1014          How much help from another person do you currently need...    Turning from your back to your side while in flat bed without using bedrails? 4  -SC     Moving from lying on back to sitting on the side of a flat bed without bedrails? 4  -SC     Moving to and from a bed to a chair (including a wheelchair)? 3  -SC     Standing up from a chair using your arms (e.g., wheelchair, bedside chair)? 3  -SC     Climbing 3-5 steps with a railing? 3  -SC     To walk in hospital room? 3  -SC     AM-PAC 6 Clicks Score (PT) 20  -SC     Highest level of mobility 6 --> Walked 10 steps or more  -SC     Row Name 03/28/23 1049 03/28/23 1014       Functional Assessment    Outcome Measure Options AM-PAC 6 Clicks Daily  Activity (OT)  -AR AM-PAC 6 Clicks Basic Mobility (PT)  -SC          User Key  (r) = Recorded By, (t) = Taken By, (c) = Cosigned By    Initials Name Provider Type    SC Jose Rosa PT Physical Therapist    Christiane Pardo OT Occupational Therapist                Occupational Therapy Education     Title: PT OT SLP Therapies (Done)     Topic: Occupational Therapy (Done)     Point: ADL training (Done)     Description:   Instruct learner(s) on proper safety adaptation and remediation techniques during self care or transfers.   Instruct in proper use of assistive devices.              Learning Progress Summary           Patient Eager, E,TB,D,H, VU,NR by AR at 3/28/2023 1051                   Point: Home exercise program (Done)     Description:   Instruct learner(s) on appropriate technique for monitoring, assisting and/or progressing therapeutic exercises/activities.              Learning Progress Summary           Patient Yeniferer, E,TB,D,H, VU,NR by AR at 3/28/2023 1051                   Point: Precautions (Done)     Description:   Instruct learner(s) on prescribed precautions during self-care and functional transfers.              Learning Progress Summary           Patient Yeniferer, E,TB,D,H, VU,NR by AR at 3/28/2023 1051                   Point: Body mechanics (Done)     Description:   Instruct learner(s) on proper positioning and spine alignment during self-care, functional mobility activities and/or exercises.              Learning Progress Summary           Patient Yeniferer, E,TB,D,H, VU,NR by AR at 3/28/2023 1051                               User Key     Initials Effective Dates Name Provider Type [facility] 02/03/23 -  Christiane Grider OT Occupational Therapist OT              OT Recommendation and Plan  Planned Therapy Interventions (OT): adaptive equipment training, BADL retraining, functional balance retraining, IADL retraining, occupation/activity based interventions, patient/caregiver  education/training, transfer/mobility retraining  Therapy Frequency (OT): daily  Plan of Care Review  Plan of Care Reviewed With: patient  Outcome Evaluation: OT educated pt on spinal precautions and incorporation into ADL routine, issued necessary AE and educated on use. He completed bed mobility with supervision, LB dressing with CGA using AE and transfer training with CGA-supervision d/t c/o dizziness. Recommend DC home with family assist.     Time Calculation:    Time Calculation- OT     Row Name 03/28/23 1052 03/28/23 0918          Time Calculation- OT    OT Start Time 0929  -AR --     OT Received On 03/28/23  -AR --     OT Goal Re-Cert Due Date 04/07/23  -AR --        Timed Charges    22961 - Gait Training Minutes  -- 10  -SC     43713 - OT Self Care/Mgmt Minutes 17  -AR --        Untimed Charges    OT Eval/Re-eval Minutes 55  -AR --        Total Minutes    Timed Charges Total Minutes 17  -AR 10  -SC     Untimed Charges Total Minutes 55  -AR --      Total Minutes 72  -AR 10  -SC           User Key  (r) = Recorded By, (t) = Taken By, (c) = Cosigned By    Initials Name Provider Type    SC Jose Rosa, PT Physical Therapist    Christiane Pardo, OT Occupational Therapist              Therapy Charges for Today     Code Description Service Date Service Provider Modifiers Qty    89315349519 HC OT SELF CARE/MGMT/TRAIN EA 15 MIN 3/28/2023 Christiane Grider OT GO 1    [id number] HC OT EVAL LOW COMPLEXITY 4 3/28/2023 Christiane Grider OT GO 1               Christiane Grider OT  3/28/2023

## 2023-03-28 NOTE — PLAN OF CARE
Goal Outcome Evaluation:  Plan of Care Reviewed With: patient        Progress: improving  Outcome Evaluation: Patient was able to mobilize out of bed and arround ibarra with a rolling walker which help his back pain. Recommend continued skilled PT prior to discharge home with family assist

## 2023-03-28 NOTE — THERAPY EVALUATION
Patient Name: Brandon Brooks  : 1945    MRN: [id number]                              Today's Date: 3/28/2023       Admit Date: 3/27/2023    Visit Dx:     ICD-10-CM ICD-9-CM   1. Spondylolisthesis, acquired  M43.10 738.4     Patient Active Problem List   Diagnosis   • Arthritis   • CKD (chronic kidney disease)   • Diabetes (HCC)   • Fatigue   • HLD (hyperlipidemia)   • Hypertension   • Lumbar radiculopathy   • Palpitations   • PVC (premature ventricular contraction)   • Spondylolisthesis, acquired     Past Medical History:   Diagnosis Date   • Arthritis    • Cancer (HCC)     prostate cancer   • GERD (gastroesophageal reflux disease)    • Hearing loss    • Hypertension    • Irregular heart beat    • Low back pain    • PONV (postoperative nausea and vomiting)      Past Surgical History:   Procedure Laterality Date   • CATARACT EXTRACTION Bilateral    • HERNIA REPAIR     • LUMBAR DISC SURGERY      Dr cameron   • PROSTATECTOMY        General Information     [facility] Name 23 1000          Physical Therapy Time and Intention    Document Type evaluation  -SC     Mode of Treatment physical therapy  -SC     Row Name 23 1000          General Information    Patient Profile Reviewed yes  -SC     Prior Level of Function independent:  walked unassisted with a painful gait. No falls  -SC     Existing Precautions/Restrictions spinal  -SC     Barriers to Rehab none identified  -SC     Row Name 23 1000          Living Environment    People in Home spouse  -[facility] Name 23 1000          Home Main Entrance    Number of Stairs, Main Entrance none  -SC     Row Name 23 1000          Stairs Within Home, Primary    Number of Stairs, Within Home, Primary twelve  -SC     Stair Railings, Within Home, Primary railings safe and in good condition  -SC     Row Name 23 1000          Cognition    Orientation Status (Cognition) oriented x 4  -SC     Row Name 23 1000          Safety Issues, Functional  Mobility    Impairments Affecting Function (Mobility) pain;balance  -SC     Comment, Safety Issues/Impairments (Mobility) alert, following commands  -SC           User Key  (r) = Recorded By, (t) = Taken By, (c) = Cosigned By    Initials Name Provider Type    Jose Simms PT Physical Therapist               Mobility     Row Name 03/28/23 1001          Bed Mobility    Bed Mobility scooting/bridging;supine-sit  -SC     Scooting/Bridging Funk (Bed Mobility) independent  -SC     Supine-Sit Funk (Bed Mobility) independent;verbal cues  -SC     Comment, (Bed Mobility) able to log roll out of bed  -SC     Row Name 03/28/23 1001          Transfers    Comment, (Transfers) Demonstrated slow transfer  -SC     Row Name 03/28/23 1001          Sit-Stand Transfer    Sit-Stand Funk (Transfers) verbal cues;standby assist  -SC     Assistive Device (Sit-Stand Transfers) walker, front-wheeled  -SC     Row Name 03/28/23 1001          Gait/Stairs (Locomotion)    Funk Level (Gait) standby assist  -SC     Assistive Device (Gait) walker, front-wheeled  -SC     Distance in Feet (Gait) 350  -SC     Deviations/Abnormal Patterns (Gait) right sided deviations;antalgic;stride length decreased;weight shifting decreased;lakhwinder decreased  -SC     Bilateral Gait Deviations forward flexed posture  -SC     Comment, (Gait/Stairs) Patient demonstraed increasing back pain when walker removed for 30 feet. Walker replaced and cues for upright posture given. Demonstrated good control of walker  -SC           User Key  (r) = Recorded By, (t) = Taken By, (c) = Cosigned By    Initials Name Provider Type    Jose Simms PT Physical Therapist               Obj/Interventions     Row Name 03/28/23 1004          Range of Motion Comprehensive    General Range of Motion neck/trunk range of motion deficits identified  -SC     Comment, General Range of Motion other extremities WFL  -SC     Row Name 03/28/23 1004           Strength Comprehensive (MMT)    General Manual Muscle Testing (MMT) Assessment no strength deficits identified  -SC     Comment, General Manual Muscle Testing (MMT) Assessment B LE: tib ant 5/5, EHL 5/5, quads 5/5  -SC     Row Name 03/28/23 1004          Motor Skills    Therapeutic Exercise shoulder;knee;hip;ankle  -SC     Row Name 03/28/23 1004          Shoulder (Therapeutic Exercise)    Shoulder (Therapeutic Exercise) AROM (active range of motion)  -SC     Shoulder AROM (Therapeutic Exercise) bilateral;flexion;extension;supine;10 repetitions  -SC     Row Name 03/28/23 1004          Hip (Therapeutic Exercise)    Hip (Therapeutic Exercise) AROM (active range of motion)  -SC     Hip AROM (Therapeutic Exercise) bilateral;flexion;extension;aBduction;aDduction;supine;10 repetitions  -[facility] Name 03/28/23 1004          Knee (Therapeutic Exercise)    Knee (Therapeutic Exercise) AROM (active range of motion);isometric exercises  -SC     Knee AROM (Therapeutic Exercise) bilateral;flexion;extension  -SC     Knee Isometrics (Therapeutic Exercise) bilateral;gluteal sets;quad sets;10 repetitions;3 second hold  -[facility] Name 03/28/23 1004          Ankle (Therapeutic Exercise)    Ankle (Therapeutic Exercise) AROM (active range of motion)  -SC     Ankle AROM (Therapeutic Exercise) bilateral;dorsiflexion;plantarflexion;10 repetitions  -[facility] Name 03/28/23 1004          Balance    Balance Assessment standing dynamic balance  -SC     Dynamic Standing Balance 1-person assist;standby assist  -SC     Position/Device Used, Standing Balance supported;walker, rolling  -SC     Comment, Balance less back pain using walker  -SC     Row Name 03/28/23 1004          Sensory Assessment (Somatosensory)    Sensory Assessment (Somatosensory) other (see comments)  diminished light touch R dorsal foot  -SC           User Key  (r) = Recorded By, (t) = Taken By, (c) = Cosigned By    Initials Name Provider Type    SC Jose Rosa, PT  Physical Therapist               Goals/Plan     [facility] Name 03/28/23 1012          Gait Training Goal 1 (PT)    Activity/Assistive Device (Gait Training Goal 1, PT) gait (walking locomotion)  -SC     Rabun Level (Gait Training Goal 1, PT) modified independence  -SC     Distance (Gait Training Goal 1, PT) 600  -SC     Time Frame (Gait Training Goal 1, PT) long term goal (LTG);10 days  -[facility] Name 03/28/23 1012          Stairs Goal 1 (PT)    Activity/Assistive Device (Stairs Goal 1, PT) stairs, all skills  -SC     Rabun Level/Cues Needed (Stairs Goal 1, PT) standby assist  -SC     Number of Stairs (Stairs Goal 1, PT) 10  -SC     Time Frame (Stairs Goal 1, PT) long term goal (LTG);10 days  -SC     Row Name 03/28/23 1012          Problem Specific Goal 1 (PT)    Problem Specific Goal 1 (PT) know back precautions and HEP  -SC     Time Frame (Problem Specific Goal 1, PT) long-term goal (LTG);2 weeks  -SC     Row Name 03/28/23 1012          Therapy Assessment/Plan (PT)    Planned Therapy Interventions (PT) bed mobility training;home exercise program;strengthening;stair training;patient/family education;transfer training  -SC           User Key  (r) = Recorded By, (t) = Taken By, (c) = Cosigned By    Initials Name Provider Type    SC Jose Rosa, PT Physical Therapist               Clinical Impression     [facility] Name 03/28/23 1010          Pain    Additional Documentation Pain Scale: FACES Pre/Post-Treatment (Group)  -SC     Row Name 03/28/23 1010          Pain Scale: FACES Pre/Post-Treatment    Pain: FACES Scale, Pretreatment 2-->hurts little bit  -SC     Posttreatment Pain Rating 4-->hurts little more  -SC     Pain Location - back  -[facility] 03/28/23 1010          Plan of Care Review    Plan of Care Reviewed With patient  -SC     Progress improving  -SC     Outcome Evaluation Patient was able to mobilize out of bed and arround ibarra with a rolling walker which help his back pain. Recommend continued  skilled PT prior to discharge home with family assist  -SC     Row Name 03/28/23 1010          Therapy Assessment/Plan (PT)    Patient/Family Therapy Goals Statement (PT) walk, play golf  -SC     Rehab Potential (PT) good, to achieve stated therapy goals  -SC     Criteria for Skilled Interventions Met (PT) yes;meets criteria  -SC     Therapy Frequency (PT) daily  -SC     Row Name 03/28/23 1010          Positioning and Restraints    Pre-Treatment Position in bed  -SC     Post Treatment Position chair  -SC     In Chair notified nsg;call light within reach;sitting;reclined;encouraged to call for assist;exit alarm on;with OT  -SC           User Key  (r) = Recorded By, (t) = Taken By, (c) = Cosigned By    Initials Name Provider Type    SC oJse Rosa, PT Physical Therapist               Outcome Measures     Row Name 03/28/23 1014          How much help from another person do you currently need...    Turning from your back to your side while in flat bed without using bedrails? 4  -SC     Moving from lying on back to sitting on the side of a flat bed without bedrails? 4  -SC     Moving to and from a bed to a chair (including a wheelchair)? 3  -SC     Standing up from a chair using your arms (e.g., wheelchair, bedside chair)? 3  -SC     Climbing 3-5 steps with a railing? 3  -SC     To walk in hospital room? 3  -SC     AM-PAC 6 Clicks Score (PT) 20  -SC     Highest level of mobility 6 --> Walked 10 steps or more  -[facility] Name 03/28/23 1014          Functional Assessment    Outcome Measure Options AM-PAC 6 Clicks Basic Mobility (PT)  -SC           User Key  (r) = Recorded By, (t) = Taken By, (c) = Cosigned By    Initials Name Provider Type    SC Jose Rosa, PT Physical Therapist                             Physical Therapy Education     Title: PT OT SLP Therapies (Done)     Topic: Physical Therapy (Done)     Point: Mobility training (Done)     Learning Progress Summary           Patient Rin, ANUSHKA,TB,D,H, VU,DU,NR  by SC at 3/28/2023 1015    Comment: reviewed HEP and back precuations                   Point: Home exercise program (Done)     Learning Progress Summary           Patient Eager, E,TB,D,H, VU,DU,NR by SC at 3/28/2023 1015    Comment: reviewed HEP and back precuations                   Point: Body mechanics (Done)     Learning Progress Summary           Patient Eager, E,TB,D,H, VU,DU,NR by SC at 3/28/2023 1015    Comment: reviewed HEP and back precuations                   Point: Precautions (Done)     Learning Progress Summary           Patient Eager, E,TB,D,H, VU,DU,NR by SC at 3/28/2023 1015    Comment: reviewed HEP and back precuations                               User Key     Initials Effective Dates Name Provider Type Discipline    SC 02/03/23 -  Jose Rosa, PT Physical Therapist PT              PT Recommendation and Plan  Planned Therapy Interventions (PT): bed mobility training, home exercise program, strengthening, stair training, patient/family education, transfer training  Plan of Care Reviewed With: patient  Progress: improving  Outcome Evaluation: Patient was able to mobilize out of bed and arround ibarra with a rolling walker which help his back pain. Recommend continued skilled PT prior to discharge home with family assist     Time Calculation:    PT Charges     Row Name 03/28/23 0918             Time Calculation    Start Time 0918  -SC      PT Received On 03/28/23  -SC      PT Goal Re-Cert Due Date 04/07/23  -SC         Time Calculation- PT    Total Timed Code Minutes- PT 30 minute(s)  -SC         Timed Charges    68986 - PT Therapeutic Exercise Minutes 15  -SC      26708 - Gait Training Minutes  10  -SC      69159 - PT Therapeutic Activity Minutes 5  -SC         Untimed Charges    PT Eval/Re-eval Minutes 30  -SC         Total Minutes    Timed Charges Total Minutes 30  -SC      Untimed Charges Total Minutes 30  -SC       Total Minutes 60  -SC            User Key  (r) = Recorded By, (t) = Taken By,  (c) = Cosigned By    Initials Name Provider Type    SC Jose Rosa, PT Physical Therapist              Therapy Charges for Today     Code Description Service Date Service Provider Modifiers Qty    17002102921 HC PT EVAL MOD COMPLEXITY 2 3/28/2023 Jose Rosa, PT GP 1    68296066707 HC PT THER PROC EA 15 MIN 3/28/2023 Jose Rosa, PT GP 1    48028350867 HC GAIT TRAINING EA 15 MIN 3/28/2023 Jose Rosa, PT GP 1          PT G-Codes  Outcome Measure Options: AM-PAC 6 Clicks Basic Mobility (PT)  AM-PAC 6 Clicks Score (PT): 20  PT Discharge Summary  Anticipated Discharge Disposition (PT): home with assist    Jose Rosa, PT  3/28/2023

## 2023-03-28 NOTE — CASE MANAGEMENT/SOCIAL WORK
Continued Stay Note   Silvana     Patient Name: Brandon Brooks  MRN: [id number]  Today's Date: 3/28/2023    Admit Date: 3/27/2023    Plan: home   Discharge Plan     Row Name 03/28/23 0956       Plan    Plan home    Patient/Family in Agreement with Plan yes    Plan Comments Pt lives in [facility] with his wife. He denies use of any DME and reports he is independent with ADLs. He is followed by his PCP and has drug coverage. Plan for discharge is to return home. No discharge needs at this time.    Final Discharge Disposition Code 01 - home or self-care               Discharge Codes    No documentation.                     Lucille Cantrell RN

## 2023-03-28 NOTE — PROGRESS NOTES
"NEUROSURGERY PROGRESS NOTE     LOS: 1 day   Patient Care Team:  Juan Sosa MD as PCP - General (Family Medicine)    Chief Complaint: Low back and right leg pain.    POD#: 1 Day Post-Op  Procedures:  L5-S1 PLIF.    Interval History:   Patient Complaints: Mild incisional pain.  Patient Denies: Preoperative right leg pain.    Vital Signs: Blood pressure 159/86, pulse 70, temperature 97.6 °F (36.4 °C), temperature source Oral, resp. rate 16, height 180.3 cm (71\"), weight 92.1 kg (203 lb), SpO2 92 %.  Intake/Output:     Intake/Output Summary (Last 24 hours) at 3/28/2023 0615  Last data filed at 3/27/2023 2130  Gross per 24 hour   Intake 1600 ml   Output 360 ml   Net 1240 ml     Drain output: 210/50 mL.    Physical Exam:  Motor function is intact in his lower extremities.  Dry dressing is in place on his incision.     Data Review:    H&H pending.    Assessment/Plan:  1.  L5-S1 spondylolisthesis and stenosis with radiculopathy status post PLIF.  2.  History of diabetes mellitus.  3.  History of hypertension.  4.  History of kidney disease.  5.  Disposition: Mobilize patient.  I anticipate that he will be discharged home in a day or 2.      Lenin Sood MD  03/28/23  06:15 EDT    "

## 2023-03-28 NOTE — PLAN OF CARE
Goal Outcome Evaluation:  Plan of Care Reviewed With: patient           Outcome Evaluation: OT educated pt on spinal precautions and incorporation into ADL routine, issued necessary AE and educated on use. He completed bed mobility with supervision, LB dressing with CGA using AE and transfer training with CGA-supervision d/t c/o dizziness. Recommend DC home with family assist.

## 2023-03-28 NOTE — PLAN OF CARE
Goal Outcome Evaluation:      Pt able to ambulate to BR and within room with standby assist, GB, and walker. Pain well controlled with cold therapy and PRN pain medications. 110 mL sanguineous drainage overnight. VSS on RA.

## 2023-03-29 ENCOUNTER — TELEPHONE (OUTPATIENT)
Dept: NEUROSURGERY | Facility: CLINIC | Age: 78
End: 2023-03-29
Payer: MEDICARE

## 2023-03-29 DIAGNOSIS — M54.16 LUMBAR RADICULOPATHY, RIGHT: Primary | ICD-10-CM

## 2023-03-29 DIAGNOSIS — M48.061 SPINAL STENOSIS, LUMBAR REGION, WITHOUT NEUROGENIC CLAUDICATION: ICD-10-CM

## 2023-03-29 LAB
GLUCOSE BLDC GLUCOMTR-MCNC: 124 MG/DL (ref 70–130)
QT INTERVAL: 420 MS
QTC INTERVAL: 426 MS

## 2023-03-29 PROCEDURE — 97116 GAIT TRAINING THERAPY: CPT

## 2023-03-29 PROCEDURE — 63710000001 ACETAMINOPHEN 325 MG TABLET: Performed by: NEUROLOGICAL SURGERY

## 2023-03-29 PROCEDURE — 82962 GLUCOSE BLOOD TEST: CPT

## 2023-03-29 PROCEDURE — 63710000001 HYDROCHLOROTHIAZIDE 12.5 MG CAPSULE: Performed by: NEUROLOGICAL SURGERY

## 2023-03-29 PROCEDURE — 63710000001 FAMOTIDINE 20 MG TABLET: Performed by: NEUROLOGICAL SURGERY

## 2023-03-29 PROCEDURE — 63710000001 METOPROLOL TARTRATE 50 MG TABLET: Performed by: NEUROLOGICAL SURGERY

## 2023-03-29 PROCEDURE — 63710000001 LISINOPRIL 10 MG TABLET: Performed by: NEUROLOGICAL SURGERY

## 2023-03-29 PROCEDURE — A9270 NON-COVERED ITEM OR SERVICE: HCPCS | Performed by: NEUROLOGICAL SURGERY

## 2023-03-29 PROCEDURE — 63710000001 OXYCODONE-ACETAMINOPHEN 7.5-325 MG TABLET: Performed by: NEUROLOGICAL SURGERY

## 2023-03-29 PROCEDURE — 63710000001 ASPIRIN 81 MG CHEWABLE TABLET: Performed by: NEUROLOGICAL SURGERY

## 2023-03-29 PROCEDURE — G0378 HOSPITAL OBSERVATION PER HR: HCPCS

## 2023-03-29 PROCEDURE — 97535 SELF CARE MNGMENT TRAINING: CPT | Performed by: OCCUPATIONAL THERAPIST

## 2023-03-29 PROCEDURE — 93005 ELECTROCARDIOGRAM TRACING: CPT | Performed by: NEUROLOGICAL SURGERY

## 2023-03-29 PROCEDURE — 93010 ELECTROCARDIOGRAM REPORT: CPT | Performed by: INTERNAL MEDICINE

## 2023-03-29 PROCEDURE — 97110 THERAPEUTIC EXERCISES: CPT

## 2023-03-29 PROCEDURE — 99024 POSTOP FOLLOW-UP VISIT: CPT | Performed by: NEUROLOGICAL SURGERY

## 2023-03-29 PROCEDURE — 63710000001 DIPHENHYDRAMINE PER 50 MG: Performed by: NEUROLOGICAL SURGERY

## 2023-03-29 RX ORDER — OXYCODONE HYDROCHLORIDE AND ACETAMINOPHEN 5; 325 MG/1; MG/1
1 TABLET ORAL 3 TIMES DAILY PRN
Qty: 20 TABLET | Refills: 0 | Status: SHIPPED | OUTPATIENT
Start: 2023-03-29

## 2023-03-29 RX ORDER — SODIUM CHLORIDE, SODIUM LACTATE, POTASSIUM CHLORIDE, CALCIUM CHLORIDE 600; 310; 30; 20 MG/100ML; MG/100ML; MG/100ML; MG/100ML
75 INJECTION, SOLUTION INTRAVENOUS CONTINUOUS
Status: DISCONTINUED | OUTPATIENT
Start: 2023-03-29 | End: 2023-03-30

## 2023-03-29 RX ADMIN — SODIUM CHLORIDE, POTASSIUM CHLORIDE, SODIUM LACTATE AND CALCIUM CHLORIDE 75 ML/HR: 600; 310; 30; 20 INJECTION, SOLUTION INTRAVENOUS at 14:14

## 2023-03-29 RX ADMIN — LISINOPRIL 10 MG: 10 TABLET ORAL at 08:15

## 2023-03-29 RX ADMIN — Medication 10 ML: at 08:17

## 2023-03-29 RX ADMIN — FAMOTIDINE 20 MG: 20 TABLET ORAL at 20:14

## 2023-03-29 RX ADMIN — OXYCODONE HYDROCHLORIDE AND ACETAMINOPHEN 1 TABLET: 7.5; 325 TABLET ORAL at 20:14

## 2023-03-29 RX ADMIN — OXYCODONE HYDROCHLORIDE AND ACETAMINOPHEN 1 TABLET: 7.5; 325 TABLET ORAL at 14:13

## 2023-03-29 RX ADMIN — DIPHENHYDRAMINE HYDROCHLORIDE 25 MG: 25 CAPSULE ORAL at 20:15

## 2023-03-29 RX ADMIN — HYDROCHLOROTHIAZIDE 12.5 MG: 12.5 CAPSULE ORAL at 08:15

## 2023-03-29 RX ADMIN — FAMOTIDINE 20 MG: 20 TABLET ORAL at 08:15

## 2023-03-29 RX ADMIN — ACETAMINOPHEN 325MG 650 MG: 325 TABLET ORAL at 20:16

## 2023-03-29 RX ADMIN — METOPROLOL TARTRATE 50 MG: 50 TABLET ORAL at 20:14

## 2023-03-29 RX ADMIN — METOPROLOL TARTRATE 50 MG: 50 TABLET ORAL at 08:15

## 2023-03-29 RX ADMIN — ASPIRIN 81 MG 81 MG: 81 TABLET ORAL at 08:15

## 2023-03-29 RX ADMIN — ACETAMINOPHEN 325MG 650 MG: 325 TABLET ORAL at 04:42

## 2023-03-29 NOTE — PLAN OF CARE
Goal Outcome Evaluation:  Plan of Care Reviewed With: patient        Progress: no change  Outcome Evaluation: Patient demonstrated some orthostatic hypotension this morning.  With BP droping from 145/74 to 113/61. He was still able to ambulate  safely on ibarra.

## 2023-03-29 NOTE — TELEPHONE ENCOUNTER
Pt is scheduled for Post Op on 04/19/23 w/ Moira Mustafa PA-C. Spoke w/ Pt and let him know that he needs to get Xray prior to Post Op appt. He is thankful for the call.

## 2023-03-29 NOTE — THERAPY TREATMENT NOTE
Patient Name: Brandon Brooks  : 1945    MRN: [id number]                              Today's Date: 3/29/2023       Admit Date: 3/27/2023    Visit Dx:     ICD-10-CM ICD-9-CM   1. Lumbar radiculopathy  M54.16 724.4   2. Spondylolisthesis, acquired  M43.10 738.4     Patient Active Problem List   Diagnosis   • Arthritis   • CKD (chronic kidney disease)   • Diabetes (HCC)   • Fatigue   • HLD (hyperlipidemia)   • Hypertension   • Lumbar radiculopathy   • Palpitations   • PVC (premature ventricular contraction)   • Spondylolisthesis, acquired     Past Medical History:   Diagnosis Date   • Arthritis    • Cancer (HCC)     prostate cancer   • GERD (gastroesophageal reflux disease)    • Hearing loss    • Hypertension    • Irregular heart beat    • Low back pain    • PONV (postoperative nausea and vomiting)      Past Surgical History:   Procedure Laterality Date   • CATARACT EXTRACTION Bilateral    • HERNIA REPAIR     • LUMBAR DISC SURGERY      Dr cameron   • LUMBAR LAMINECTOMY WITH FUSION N/A 3/27/2023    Procedure: LUMBAR FUSION DECOMPRESSON WITH PEDICLE SCREWS L5-S1;  Surgeon: Lenin Sood MD;  Location: [facility];  Service: Neurosurgery;  Laterality: N/A;   • PROSTATECTOMY        General Information     Row Name 23 1400          OT Time and Intention    Document Type therapy note (daily note)  -AR     Mode of Treatment individual therapy;occupational therapy  -AR     Row Name 23 1400          General Information    Existing Precautions/Restrictions spinal;fall  monitor BP  -AR     Row Name 23 1400          Cognition    Orientation Status (Cognition) oriented x 4  -AR     Row Name 23 1400          Safety Issues, Functional Mobility    Safety Issues Affecting Function (Mobility) awareness of need for assistance;insight into deficits/self-awareness;safety precaution awareness;safety precautions follow-through/compliance;positioning of assistive device  -AR     Impairments Affecting  Function (Mobility) balance;pain;range of motion (ROM)  -AR           User Key  (r) = Recorded By, (t) = Taken By, (c) = Cosigned By    Initials Name Provider Type    Christiane Pardo OT Occupational Therapist                 Mobility/ADL's     Row Name 03/29/23 1402          Sit-Stand Transfer    Sit-Stand [place] (Transfers) supervision;verbal cues  -AR     Assistive Device (Sit-Stand Transfers) walker, front-wheeled  -AR     Row Name 03/29/23 1402          Stand-Sit Transfer    Stand-Sit [place] (Transfers) contact guard;verbal cues  -AR     Assistive Device (Stand-Sit Transfers) walker, front-wheeled  -AR     Row Name 03/29/23 1402          Functional Mobility    Functional Mobility- Ind. Level minimum assist (75% patient effort)  -AR     Functional Mobility- Device walker, front-wheeled  -AR     Row Name 03/29/23 1402          Activities of Daily Living    BADL Assessment/Intervention lower body dressing;bathing  -AR     Row Name 03/29/23 [facility]          Bathing Assessment/Intervention    Comment, (Bathing) Reviewed use of sponge to maintain spinal precautions  -AR     Row Name 03/29/23 [facility]          Lower Body Dressing Assessment/Training    Comment, (Lower Body Dressing) Reviewed safe positioning to complete LB dressing as well as AE use. Pt already dressed in anticipation of upcoming DC home and AE not available for trial  -AR     Row Name 03/29/23 1402          Self-Feeding Assessment/Training    [place] Level (Feeding) liquids to mouth;supervision  -AR     Position (Self-Feeding) supported sitting  -AR           User Key  (r) = Recorded By, (t) = Taken By, (c) = Cosigned By    Initials Name Provider Type    Christiane Pardo OT Occupational Therapist               Obj/Interventions     Row Name 03/29/23 1406          Balance    Balance Assessment sitting static balance;sitting dynamic balance;standing static balance;standing dynamic balance  -AR     Static Sitting Balance  supervision  -AR     Dynamic Sitting Balance supervision  -AR     Position, Sitting Balance supported;sitting in chair  -AR     Static Standing Balance contact guard  -AR     Dynamic Standing Balance minimal assist  -AR     Position/Device Used, Standing Balance supported;walker, front-wheeled  -AR           User Key  (r) = Recorded By, (t) = Taken By, (c) = Cosigned By    Initials Name Provider Type    AR Christiane Grider, OT Occupational Therapist               Goals/Plan    No documentation.                Clinical Impression     Row Name 03/29/23 1406          Pain Assessment    Pretreatment Pain Rating 5/10  -AR     Posttreatment Pain Rating 5/10  -AR     Pain Location incisional  -AR     Pain Location - back  -AR     Pre/Posttreatment Pain Comment Pt states he has pain medication in his bag if needed, OT notified nurse of this. He declined need for pain medication for cold pack.  -AR     Pain Intervention(s) Medication (See MAR);Repositioned;Ambulation/increased activity;Nursing Notified  -AR     Row Name 03/29/23 1406          Plan of Care Review    Plan of Care Reviewed With patient;spouse  -AR     Outcome Evaluation Pt required cues to recall/incorporate spinal precautions. He completed transfer training with CGA and demo left lateral LOB into wall during in-room ambulation, BP at this time 105/64. Pt reluctant to use walker and be DC with one, however utilized with encouragement. OT notified nurse of LOB and pt does not appear safe to DC home today, is high fall risk.  -AR     Row Name 03/29/23 1406          Therapy Plan Review/Discharge Plan (OT)    Anticipated Discharge Disposition (OT) inpatient rehabilitation facility  -AR     Row Name 03/29/23 1406          Vital Signs    Pre Systolic BP Rehab 147  -AR     Pre Treatment Diastolic BP 75  -AR     Post Systolic BP Rehab 105  -AR     Post Treatment Diastolic BP 64  -AR     Pre Patient Position Sitting  -AR     Intra Patient Position Standing  -AR      Post Patient Position Sitting  -AR     Row Name 03/29/23 1406          Positioning and Restraints    Pre-Treatment Position sitting in chair/recliner  -AR     Post Treatment Position chair  -AR     In Bed notified nsg;call light within reach;encouraged to call for assist;sitting;exit alarm on;with family/caregiver  -AR           User Key  (r) = Recorded By, (t) = Taken By, (c) = Cosigned By    Initials Name Provider Type    Christiane Pardo, OT Occupational Therapist               Outcome Measures     Row Name 03/29/23 1430          How much help from another is currently needed...    Putting on and taking off regular lower body clothing? 3  -AR     Bathing (including washing, rinsing, and drying) 3  -AR     Toileting (which includes using toilet bed pan or urinal) 3  -AR     Putting on and taking off regular upper body clothing 3  -AR     Taking care of personal grooming (such as brushing teeth) 3  -AR     Eating meals 3  -AR     AM-PAC 6 Clicks Score (OT) 18  -AR     Row Name 03/29/23 0940 03/29/23 0800       How much help from another person do you currently need...    Turning from your back to your side while in flat bed without using bedrails? 4  -SC 4  -AC    Moving from lying on back to sitting on the side of a flat bed without bedrails? 4  -SC 4  -AC    Moving to and from a bed to a chair (including a wheelchair)? 4  -SC 4  -AC    Standing up from a chair using your arms (e.g., wheelchair, bedside chair)? 4  -SC 4  -AC    Climbing 3-5 steps with a railing? 3  -SC 3  -AC    To walk in hospital room? 3  -SC 3  -AC    AM-PAC 6 Clicks Score (PT) 22  -SC 22  -AC    Highest level of mobility 7 --> Walked 25 feet or more  -SC 7 --> Walked 25 feet or more  -AC    Row Name 03/29/23 1430 03/29/23 0940       Functional Assessment    Outcome Measure Options AM-PAC 6 Clicks Daily Activity (OT)  -AR AM-PAC 6 Clicks Basic Mobility (PT)  -SC          User Key  (r) = Recorded By, (t) = Taken By, (c) = Cosigned By     Initials Name Provider Type    DAVY Rosa Meghabryce MONTES, PT Physical Therapist    Christiane Pardo OT Occupational Therapist    Keturah Berkowitz RN Registered Nurse                Occupational Therapy Education     Title: PT OT SLP Therapies (Done)     Topic: Occupational Therapy (Done)     Point: ADL training (Done)     Description:   Instruct learner(s) on proper safety adaptation and remediation techniques during self care or transfers.   Instruct in proper use of assistive devices.              Learning Progress Summary           Patient Acceptance, E,D, VU,NR by AR at 3/29/2023 1431    Eager, E,TB,D,H, VU,NR by AR at 3/28/2023 1051   Family Acceptance, E,D, VU,NR by AR at 3/29/2023 1431                   Point: Home exercise program (Done)     Description:   Instruct learner(s) on appropriate technique for monitoring, assisting and/or progressing therapeutic exercises/activities.              Learning Progress Summary           Patient Acceptance, E,D, VU,NR by AR at 3/29/2023 1431    Eager, E,TB,D,H, VU,NR by AR at 3/28/2023 1051   Family Acceptance, E,D, VU,NR by AR at 3/29/2023 1431                   Point: Precautions (Done)     Description:   Instruct learner(s) on prescribed precautions during self-care and functional transfers.              Learning Progress Summary           Patient Acceptance, E,D, VU,NR by AR at 3/29/2023 1431    Eager, E,TB,D,H, VU,NR by AR at 3/28/2023 1051   Family Acceptance, E,D, VU,NR by AR at 3/29/2023 1431                   Point: Body mechanics (Done)     Description:   Instruct learner(s) on proper positioning and spine alignment during self-care, functional mobility activities and/or exercises.              Learning Progress Summary           Patient Acceptance, E,D, VU,NR by AR at 3/29/2023 1431    Eager, E,TB,D,H, VU,NR by AR at 3/28/2023 1051   Family Acceptance, E,D, VU,NR by AR at 3/29/2023 1431                               User Key     Initials Effective Dates Name  Provider Type Discipline    AR 02/03/23 -  Christiane Grider OT Occupational Therapist OT              OT Recommendation and Plan  Planned Therapy Interventions (OT): adaptive equipment training, BADL retraining, functional balance retraining, IADL retraining, occupation/activity based interventions, patient/caregiver education/training, transfer/mobility retraining  Therapy Frequency (OT): daily  Plan of Care Review  Plan of Care Reviewed With: patient, spouse  Outcome Evaluation: Pt required cues to recall/incorporate spinal precautions. He completed transfer training with CGA and demo left lateral LOB into wall during in-room ambulation, BP at this time 105/64. Pt reluctant to use walker and be DC with one, however utilized with encouragement. OT notified nurse of LOB and pt does not appear safe to DC home today, is high fall risk.     Time Calculation:    Time Calculation- OT     Row Name 03/29/23 1431 03/29/23 0855          Time Calculation- OT    OT Start Time 1314  -AR --     OT Received On 03/29/23  -AR --     OT Goal Re-Cert Due Date 04/07/23  -AR --        Timed Charges    66913 - Gait Training Minutes  -- 15  -SC     59043 - OT Self Care/Mgmt Minutes 31  -AR --        Total Minutes    Timed Charges Total Minutes 31  -AR 15  -SC      Total Minutes 31  -AR 15  -SC           User Key  (r) = Recorded By, (t) = Taken By, (c) = Cosigned By    Initials Name Provider Type    SC Jose Rosa, PT Physical Therapist    Christiane Pardo OT Occupational Therapist              Therapy Charges for Today     Code Description Service Date Service Provider Modifiers Qty    11275927685 HC OT SELF CARE/MGMT/TRAIN EA 15 MIN 3/28/2023 Christiane Grider OT GO 1    06227130653 HC OT EVAL LOW COMPLEXITY 4 3/28/2023 Christiane Grider OT GO 1    80536657483 HC OT SELF CARE/MGMT/TRAIN EA 15 MIN 3/29/2023 Christiane Grider OT GO 2               Christiane Grider OT  3/29/2023

## 2023-03-29 NOTE — TELEPHONE ENCOUNTER
Can someone please place an orde for AP and lateral lumbar spine needs for 3 wks f/u Post Lumbar Fusion. Thank you

## 2023-03-29 NOTE — PROGRESS NOTES
Patient was doing very well when I went over to check on him right before lunch. Reports he has been up and moving about following his episode this morning with no complaints. He has been continuing to drink plenty of fluids. He was very conversational and answering all questions appropriately. Plan for continued discharge home this afternoon.

## 2023-03-29 NOTE — PROGRESS NOTES
"NEUROSURGERY PROGRESS NOTE     LOS: 1 day   Patient Care Team:  Juan Sosa MD as PCP - General (Family Medicine)    Chief Complaint: Low back and right leg pain.    POD#: 2 Days Post-Op  Procedures:  L5-S1 PLIF.    Interval History:   Patient Complaints: Incisional pain.  Patient Denies: Preoperative radicular leg pain.    Vital Signs: Blood pressure 121/68, pulse 74, temperature 98 °F (36.7 °C), temperature source Oral, resp. rate 18, height 180.3 cm (71\"), weight 92.1 kg (203 lb), SpO2 96 %.  Intake/Output:     Intake/Output Summary (Last 24 hours) at 3/29/2023 0603  Last data filed at 3/29/2023 0442  Gross per 24 hour   Intake 840 ml   Output 550 ml   Net 290 ml     Drain output: 100/50 mL.    Physical Exam:  The patient is awake and alert.  He is in good spirits.  Dry dressing is in place on his incision.     Data Review:    Results from last 7 days   Lab Units 03/28/23  0814   HEMOGLOBIN g/dL 14.1   HEMATOCRIT % 41.0         Assessment/Plan:  1.  L5-S1 spondylolisthesis and stenosis with radiculopathy status post PLIF.  2.  History of diabetes mellitus.  3.  History of hypertension.  4.  History of kidney disease.  5.  Disposition: Mobilize patient.  Discontinue drain.  Home this afternoon.  Follow-up with SIDDHARTHA in the office in approximately 3 weeks.      Lenin Sood MD  03/29/23  06:03 EDT    "

## 2023-03-29 NOTE — CASE MANAGEMENT/SOCIAL WORK
Continued Stay Note  [facility]     Patient Name: Brandon Brooks  MRN: [id number]  Today's Date: 3/29/2023    Admit Date: 3/27/2023    Plan: home   Discharge Plan     Row Name 03/29/23 1124       Plan    Plan Comments Pt to discharge home. A rolling walker will be brought to his room prior to discharge.  No further discharge needs at this time               Discharge Codes    No documentation.               Expected Discharge Date and Time     Expected Discharge Date Expected Discharge Time    Mar 29, 2023             Lucille Cantrell RN

## 2023-03-29 NOTE — THERAPY TREATMENT NOTE
Patient Name: Brandon Brooks  : 1945    MRN: [id number]                              Today's Date: 3/29/2023       Admit Date: 3/27/2023    Visit Dx:     ICD-10-CM ICD-9-CM   1. Spondylolisthesis, acquired  M43.10 738.4     Patient Active Problem List   Diagnosis   • Arthritis   • CKD (chronic kidney disease)   • Diabetes (HCC)   • Fatigue   • HLD (hyperlipidemia)   • Hypertension   • Lumbar radiculopathy   • Palpitations   • PVC (premature ventricular contraction)   • Spondylolisthesis, acquired     Past Medical History:   Diagnosis Date   • Arthritis    • Cancer (HCC)     prostate cancer   • GERD (gastroesophageal reflux disease)    • Hearing loss    • Hypertension    • Irregular heart beat    • Low back pain    • PONV (postoperative nausea and vomiting)      Past Surgical History:   Procedure Laterality Date   • CATARACT EXTRACTION Bilateral    • HERNIA REPAIR     • LUMBAR DISC SURGERY      Dr cameron   • LUMBAR LAMINECTOMY WITH FUSION N/A 3/27/2023    Procedure: LUMBAR FUSION DECOMPRESSON WITH PEDICLE SCREWS L5-S1;  Surgeon: Lenin Sood MD;  [facility];  Service: Neurosurgery;  Laterality: N/A;   • PROSTATECTOMY        General Information     Row Name 23          Physical Therapy Time and Intention    Document Type evaluation  -SC     Mode of Treatment physical therapy  -SC     Row Name 23          General Information    Patient Profile Reviewed yes  -SC     Existing Precautions/Restrictions spinal;fall  -SC     Row Name 23          Cognition    Orientation Status (Cognition) oriented x 4  -SC     Row Name 23          Safety Issues, Functional Mobility    Impairments Affecting Function (Mobility) balance;pain;range of motion (ROM)  -SC     Comment, Safety Issues/Impairments (Mobility) alert, following commands  -SC           User Key  (r) = Recorded By, (t) = Taken By, (c) = Cosigned By    Initials Name Provider Type    SC Jose Rosa PT  Physical Therapist               Mobility     Row Name 03/29/23 0928          Bed Mobility    Bed Mobility scooting/bridging;supine-sit  -SC     Scooting/Bridging [place] (Bed Mobility) independent  -SC     Supine-Sit [place] (Bed Mobility) independent;verbal cues  -SC     Comment, (Bed Mobility) cue for improving log rolling  -SC     Row Name 03/29/23 0928          Transfers    Comment, (Transfers) demonstrated slow transfer  -[facility] Name 03/29/23 0928          Sit-Stand Transfer    Sit-Stand [place] (Transfers) modified independence  -SC     Assistive Device (Sit-Stand Transfers) walker, front-wheeled  -SC     Row Name 03/29/23 0928          Gait/Stairs (Locomotion)    [place] Level (Gait) contact guard  -SC     Assistive Device (Gait) walker, front-wheeled  -SC     Distance in Feet (Gait) 350  -SC     Deviations/Abnormal Patterns (Gait) lakhwinder decreased;stride length decreased  -SC     Bilateral Gait Deviations forward flexed posture  -SC     Comment, (Gait/Stairs) Cues for upright posture- difficulty sustaining this. NO LOB or c/o dizziness  -SC           User Key  (r) = Recorded By, (t) = Taken By, (c) = Cosigned By    Initials Name Provider Type    SC Jose Rosa PT Physical Therapist               Obj/Interventions     Row Name 03/29/23 0931          Motor Skills    Therapeutic Exercise hip  -[facility] Name 03/29/23 0931          Shoulder (Therapeutic Exercise)    Shoulder (Therapeutic Exercise) AROM (active range of motion)  -SC     Shoulder AROM (Therapeutic Exercise) bilateral;flexion;extension;supine  -[facility] Name 03/29/23 0931          Hip (Therapeutic Exercise)    Hip (Therapeutic Exercise) AROM (active range of motion)  -SC     Hip AROM (Therapeutic Exercise) bilateral;flexion;extension;aBduction;aDduction;10 repetitions  with abdominal sets  -SC     Row Name 03/29/23 0931          Balance    Dynamic Standing Balance contact guard  -SC     Position/Device Used,  Standing Balance supported;walker, rolling  -SC     Comment, Balance no lob  -SC           User Key  (r) = Recorded By, (t) = Taken By, (c) = Cosigned By    Initials Name Provider Type    SC Jose Rosa, PT Physical Therapist               Goals/Plan    No documentation.                Clinical Impression     Row Name 03/29/23 0936          Pain Scale: FACES Pre/Post-Treatment    Pain: FACES Scale, Pretreatment 2-->hurts little bit  -SC     Posttreatment Pain Rating 4-->hurts little more  -SC     Pain Location - back  -SC     Row Name 03/29/23 0936          Plan of Care Review    Plan of Care Reviewed With patient  -SC     Progress no change  -SC     Outcome Evaluation Patient demonstrated some orthostatic hypotension this morning.  With BP droping from 145/74 to 113/61. He was still able to ambulate  safely on ibarra.  -[facility] Name 03/29/23 0936          Therapy Assessment/Plan (PT)    Patient/Family Therapy Goals Statement (PT) go home  -SC     Rehab Potential (PT) good, to achieve stated therapy goals  -SC     Criteria for Skilled Interventions Met (PT) yes;meets criteria  -SC     Therapy Frequency (PT) daily  -[facility] Name 03/29/23 0936          Vital Signs    Pre Systolic BP Rehab 145  -SC     Pre Treatment Diastolic BP 74  supine  -SC     Intra Systolic BP Rehab 132  -SC     Intra Treatment Diastolic BP 76  sitting  -SC     Post Systolic BP Rehab 113  -SC     Post Treatment Diastolic BP 61  standing  -SC     Post SpO2 (%) 97  -SC     O2 Delivery Post Treatment room air  -SC     Row Name 03/29/23 0936          Positioning and Restraints    Pre-Treatment Position in bed  -SC     Post Treatment Position bed  -SC     In Bed supine;sitting;call light within reach;encouraged to call for assist;exit alarm on  -SC           User Key  (r) = Recorded By, (t) = Taken By, (c) = Cosigned By    Initials Name Provider Type    SC Jose Rosa, PT Physical Therapist               Outcome Measures     Row Name  03/29/23 0940 03/29/23 0800       How much help from another person do you currently need...    Turning from your back to your side while in flat bed without using bedrails? 4  -SC 4  -AC    Moving from lying on back to sitting on the side of a flat bed without bedrails? 4  -SC 4  -AC    Moving to and from a bed to a chair (including a wheelchair)? 4  -SC 4  -AC    Standing up from a chair using your arms (e.g., wheelchair, bedside chair)? 4  -SC 4  -AC    Climbing 3-5 steps with a railing? 3  -SC 3  -AC    To walk in hospital room? 3  -SC 3  -AC    AM-PAC 6 Clicks Score (PT) 22  -SC 22  -    Highest level of mobility 7 --> Walked 25 feet or more  -SC 7 --> Walked 25 feet or more  -    Row Name 03/29/23 0940          Functional Assessment    Outcome Measure Options AM-PAC 6 Clicks Basic Mobility (PT)  -SC           User Key  (r) = Recorded By, (t) = Taken By, (c) = Cosigned By    Initials Name Provider Type    SC Jose Rosa PT Physical Therapist    Keturah Berkowitz RN Registered Nurse                             Physical Therapy Education     Title: PT OT SLP Therapies (Done)     Topic: Physical Therapy (Done)     Point: Mobility training (Done)     Learning Progress Summary           Patient ANUSHKA Gar VU by SC at 3/29/2023 0941    Comment: reviewed benefits of walking    ANUSHKA Gar,TB,D,H, VU,DU,NR by SC at 3/28/2023 1015    Comment: reviewed HEP and back precuations                   Point: Home exercise program (Done)     Learning Progress Summary           Patient ANUSHKA Gar, VU by SC at 3/29/2023 0941    Comment: reviewed benefits of walking    Rin, ANUSHKA,TB,D,H, VU,DU,NR by SC at 3/28/2023 1015    Comment: reviewed HEP and back precuations                   Point: Body mechanics (Done)     Learning Progress Summary           Patient ANUSHKA Gar, VU by SC at 3/29/2023 0941    Comment: reviewed benefits of walking    Rin, ANUSHKA,TB,D,H, VU,DU,NR by SC at 3/28/2023 1015    Comment: reviewed HEP and back precuations                    Point: Precautions (Done)     Learning Progress Summary           Patient ANUSHKA Gar, OMAR by SC at 3/29/2023 0941    Comment: reviewed benefits of walking    ANUSHKA Gar,TB,D,H, OMAR,DU,NR by SC at 3/28/2023 1015    Comment: reviewed HEP and back precuations                               User Key     Initials Effective Dates Name Provider Type Discipline    SC 02/03/23 -  Jose Rosa, PT Physical Therapist PT              PT Recommendation and Plan  Planned Therapy Interventions (PT): bed mobility training, home exercise program, strengthening, stair training, patient/family education, transfer training  Plan of Care Reviewed With: patient  Progress: no change  Outcome Evaluation: Patient demonstrated some orthostatic hypotension this morning.  With BP droping from 145/74 to 113/61. He was still able to ambulate  safely on ibarra.     Time Calculation:    PT Charges     Row Name 03/29/23 0855             Time Calculation    Start Time 0855  -SC      PT Received On 03/29/23  -SC      PT Goal Re-Cert Due Date 04/07/23  -SC         Time Calculation- PT    Total Timed Code Minutes- PT 30 minute(s)  -SC         Timed Charges    30665 - PT Therapeutic Exercise Minutes 10  -SC      66708 - Gait Training Minutes  15  -SC      40818 - PT Therapeutic Activity Minutes 5  -SC         Total Minutes    Timed Charges Total Minutes 30  -SC       Total Minutes 30  -SC            User Key  (r) = Recorded By, (t) = Taken By, (c) = Cosigned By    Initials Name Provider Type    SC Jose Rosa, PT Physical Therapist              Therapy Charges for Today     Code Description Service Date Service Provider Modifiers Qty    06159021748 HC PT EVAL MOD COMPLEXITY 2 3/28/2023 Jose Rosa, PT GP 1    72564132095 HC PT THER PROC EA 15 MIN 3/28/2023 Jose Rosa, PT GP 1    94789504283 HC GAIT TRAINING EA 15 MIN 3/28/2023 Jose Rosa, PT GP 1    21543300416 HC PT THER PROC EA 15 MIN 3/29/2023 Jose Rosa, PT GP 1     [id number]  GAIT TRAINING EA 15 MIN 3/29/2023 Moe, Jose MONTES, PT GP 1          PT G-Codes  Outcome Measure Options: AM-PAC 6 Clicks Basic Mobility (PT)  AM-PAC 6 Clicks Score (PT): 22  AM-PAC 6 Clicks Score (OT): 18  PT Discharge Summary  Anticipated Discharge Disposition (PT): home with assist    Jose Rosa, PT  3/29/2023

## 2023-03-29 NOTE — PLAN OF CARE
Goal Outcome Evaluation:  Plan of Care Reviewed With: patient, spouse           Outcome Evaluation: Pt required cues to recall/incorporate spinal precautions. He completed transfer training with CGA and demo left lateral LOB into wall during in-room ambulation, BP at this time 105/64. Pt reluctant to use walker and be DC with one, however utilized with encouragement. OT notified nurse of LOB and pt does not appear safe to DC home today, is high fall risk.

## 2023-03-29 NOTE — SIGNIFICANT NOTE
Patient in restroom and found to be disoriented and diaphoretic. Placed in recliner and then transferred back to the bed. . VSS. Once back in bed he became more responsive and more alert and answering questions more appropriately. EKG obtained and Dr. Sood made aware of the incident by phone. No new orders. Patient encouraged to drink more fluids.

## 2023-03-30 VITALS
DIASTOLIC BLOOD PRESSURE: 82 MMHG | WEIGHT: 203 LBS | RESPIRATION RATE: 16 BRPM | BODY MASS INDEX: 28.42 KG/M2 | HEART RATE: 75 BPM | HEIGHT: 71 IN | OXYGEN SATURATION: 96 % | SYSTOLIC BLOOD PRESSURE: 172 MMHG | TEMPERATURE: 98.1 F

## 2023-03-30 PROCEDURE — 63710000001 ACETAMINOPHEN 325 MG TABLET: Performed by: NEUROLOGICAL SURGERY

## 2023-03-30 PROCEDURE — A9270 NON-COVERED ITEM OR SERVICE: HCPCS | Performed by: NEUROLOGICAL SURGERY

## 2023-03-30 PROCEDURE — 63710000001 METOPROLOL TARTRATE 50 MG TABLET: Performed by: NEUROLOGICAL SURGERY

## 2023-03-30 PROCEDURE — 63710000001 FAMOTIDINE 20 MG TABLET: Performed by: NEUROLOGICAL SURGERY

## 2023-03-30 PROCEDURE — 63710000001 LISINOPRIL 10 MG TABLET: Performed by: NEUROLOGICAL SURGERY

## 2023-03-30 PROCEDURE — G0378 HOSPITAL OBSERVATION PER HR: HCPCS

## 2023-03-30 PROCEDURE — 99024 POSTOP FOLLOW-UP VISIT: CPT | Performed by: NEUROLOGICAL SURGERY

## 2023-03-30 PROCEDURE — 63710000001 DOCUSATE SODIUM 100 MG CAPSULE: Performed by: NEUROLOGICAL SURGERY

## 2023-03-30 PROCEDURE — 63710000001 BISACODYL 10 MG SUPPOSITORY: Performed by: NEUROLOGICAL SURGERY

## 2023-03-30 PROCEDURE — 63710000001 ASPIRIN 81 MG CHEWABLE TABLET: Performed by: NEUROLOGICAL SURGERY

## 2023-03-30 PROCEDURE — 63710000001 HYDROCHLOROTHIAZIDE 12.5 MG CAPSULE: Performed by: NEUROLOGICAL SURGERY

## 2023-03-30 RX ORDER — BISACODYL 10 MG
10 SUPPOSITORY, RECTAL RECTAL ONCE
Status: COMPLETED | OUTPATIENT
Start: 2023-03-30 | End: 2023-03-30

## 2023-03-30 RX ADMIN — ASPIRIN 81 MG 81 MG: 81 TABLET ORAL at 08:03

## 2023-03-30 RX ADMIN — DOCUSATE SODIUM 100 MG: 100 CAPSULE, LIQUID FILLED ORAL at 06:10

## 2023-03-30 RX ADMIN — ACETAMINOPHEN 325MG 650 MG: 325 TABLET ORAL at 05:16

## 2023-03-30 RX ADMIN — FAMOTIDINE 20 MG: 20 TABLET ORAL at 08:03

## 2023-03-30 RX ADMIN — Medication 10 MG: at 06:10

## 2023-03-30 RX ADMIN — METOPROLOL TARTRATE 50 MG: 50 TABLET ORAL at 08:03

## 2023-03-30 RX ADMIN — HYDROCHLOROTHIAZIDE 12.5 MG: 12.5 CAPSULE ORAL at 08:03

## 2023-03-30 RX ADMIN — LISINOPRIL 10 MG: 10 TABLET ORAL at 08:03

## 2023-03-30 RX ADMIN — Medication 10 ML: at 08:04

## 2023-03-30 NOTE — CASE MANAGEMENT/SOCIAL WORK
Case Management Discharge Note                Selected Continued Care - Discharged on 3/30/2023 Admission date: 3/27/2023 - Discharge disposition: Home or Self Care    Destination    No services have been selected for the patient.              Durable Medical Equipment Coordination complete.    Service Provider Selected Services Address Phone Fax Patient Preferred    [facility] [address] [facility] [id number] [phone] --          Dialysis/Infusion    No services have been selected for the patient.              Home Medical Care    No services have been selected for the patient.              Therapy    No services have been selected for the patient.              Community Resources    No services have been selected for the patient.              Community & DME    No services have been selected for the patient.                       Final Discharge Disposition Code: 01 - home or self-care

## 2023-03-30 NOTE — PLAN OF CARE
Goal Outcome Evaluation:      No c/o dizziness, diaphoresis, or confusion overnight. VSS on RA, Pain med administered 1x. Aquacell dressing CDI. Pt able to ambulate to BR with standby assist, GB, and walker.

## 2023-03-30 NOTE — PROGRESS NOTES
"NEUROSURGERY PROGRESS NOTE     LOS: 1 day   Patient Care Team:  Juan Sosa MD as PCP - General (Family Medicine)    Chief Complaint: Low back and right leg pain.    POD#: 3 Days Post-Op  Procedures:  L5-S1 PLIF.    Interval History:   Patient Complaints: Mild incisional pain.  He has some constipation.  Patient Denies: Headache, shortness of breath, chest pain, dizziness, confusion.    Vital Signs: Blood pressure 158/74, pulse 62, temperature 98.1 °F (36.7 °C), temperature source Oral, resp. rate 18, height 180.3 cm (71\"), weight 92.1 kg (203 lb), SpO2 96 %.  Intake/Output:     Intake/Output Summary (Last 24 hours) at 3/30/2023 0552  Last data filed at 3/29/2023 1753  Gross per 24 hour   Intake 600 ml   Output 200 ml   Net 400 ml       Physical Exam:  The patient is awake and alert.  He is well oriented.  Dry dressing is in place on his incision.       Assessment/Plan:  1.  L5-S1 spondylolisthesis and stenosis with radiculopathy status post PLIF.  2.  History of diabetes mellitus.  3.  History of hypertension.  4.  History of kidney disease.  5.  Disposition: Home today.      Lenin Sood MD  03/30/23  05:52 EDT    "

## 2023-03-31 NOTE — DISCHARGE SUMMARY
[facility]    Date of Admission: 3/27/2023  Date of Discharge:  3/30/2023    Discharge Diagnosis: L5-S1 spondylolisthesis and stenosis with radiculopathy status post PLIF    Procedures Performed  Procedure(s):  LUMBAR FUSION DECOMPRESSON WITH PEDICLE SCREWS L5-S1         History of Present Illness:  Mr. Brooks is a 77-year-old gentleman who previously retired from the operative business.  He has suffered from chronic back difficulties since the 1960s.  He has had previous operations with Dr. Rodriguez that may have involved the L4-5 and L5-S1 levels.  However he continues to harbor low back pain that radiates down the posterior lateral aspect of the right leg into the top of the foot.  This is progressively worsened over the past several years.  He has tried numerous conservative and pharmacological measures to no avail.  Symptoms were not positional.  Pain did seem more severe at times when he was more active.  He denies any left lower extremity symptoms.  Study demonstrated spondylolisthesis at L5-S1 with severe by foraminal compromise particularly on the right.  As such, he underwent a L5-S1 lumbar decompression with fusion by Dr. Sood on 3/27/2023 without complications.    Hospital Course:   He was discharged 3 days postoperatively.  Patient was pleasant cooperative during his stay.  Motor function remained intact in his bilateral lower extremities.  Dry dressing remained in place over the incision.  Drain was discontinued 2 days postoperatively.  Patient denied any preoperative leg pain.  Patient had a couple spells of dizziness and mild disorientation during his stay.  He was kept 1 day extra for observation.  He continued to deny any headaches, shortness of breath, chest pain, dizziness, and confusion.  He is significantly improved.  He was then discharged home he will follow-up in our office in approximately 3 weeks for an incision and wound  check.        Condition on Discharge:  Stable  Discharge to: Home      Discharge Medications     Discharge Medications      New Medications      Instructions Start Date   oxyCODONE-acetaminophen 5-325 MG per tablet  Commonly known as: PERCOCET   1 tablet, Oral, 3 Times Daily PRN         Continue These Medications      Instructions Start Date   acetaminophen 325 MG tablet  Commonly known as: TYLENOL   650 mg, Oral, Every 6 Hours PRN, On rare occassion      aspirin 81 MG chewable tablet   81 mg, Oral, Daily      cloNIDine 0.1 MG tablet  Commonly known as: CATAPRES   0.1 mg, Oral, As Needed, Take one for systolic higher than 165 or diastole higher than 100      ezetimibe 10 MG tablet  Commonly known as: ZETIA   10 mg, Oral, Daily      lisinopril-hydrochlorothiazide 20-12.5 MG per tablet  Commonly known as: PRINZIDE,ZESTORETIC   Take 1 tablet by mouth Daily.      metoprolol tartrate 50 MG tablet  Commonly known as: LOPRESSOR   50 mg, Oral, 2 Times Daily             Follow-up Appointments  Future Appointments   Date Time Provider Department Center   4/19/2023 10:30 AM Faina Mustafa PA-C [facility]     Additional Instructions for the Follow-ups that You Need to Schedule     Discharge Follow-up with Specified Provider: [facility]; 3 Weeks   As directed      To: [facility]    Follow Up: 3 Weeks    Follow Up Details: Follow-up with physician's assistant in 3 weeks with AP and lateral lumbar spine x-rays               Referring Provider  Lexa Cornell MD    PCP   Juan Sosa MD Chaz Clusky, PA-C  03/31/23  11:09 EDT

## 2023-04-03 ENCOUNTER — NURSE TRIAGE (OUTPATIENT)
Dept: CALL CENTER | Facility: HOSPITAL | Age: 78
End: 2023-04-03
Payer: MEDICARE

## 2023-04-03 NOTE — TELEPHONE ENCOUNTER
Reason for Disposition  • Last bowel movement (BM) > 4 days ago    Additional Information  • Negative: Sounds like a life-threatening emergency to the triager  • Negative: Chest pain  • Negative: Difficulty breathing  • Negative: Acting confused (e.g., disoriented, slurred speech) or excessively sleepy  • Negative: Surgical incision symptoms and questions  • Negative: [1] Discomfort (pain, burning or stinging) when passing urine AND [2] male  • Negative: [1] Discomfort (pain, burning or stinging) when passing urine AND [2] female  • Negative: New or worsening leg (calf, thigh) pain  • Negative: New or worsening leg swelling  • Negative: Dizziness is severe, or persists > 24 hours after surgery  • Negative: Pain, redness, swelling, or pus at IV Site  • Negative: Symptoms arising from use of a urinary catheter (Harris or Coude)  • Negative: Cast problems or questions  • Negative: Medication question  • Negative: [1] Widespread rash AND [2] bright red, sunburn-like  • Negative: [1] SEVERE headache AND [2] after spinal (epidural) anesthesia  • Negative: [1] Vomiting AND [2] persists > 4 hours  • Negative: [1] Vomiting AND [2] abdomen looks much more swollen than usual  • Negative: [1] Drinking very little AND [2] dehydration suspected (e.g., no urine > 12 hours, very dry mouth, very lightheaded)  • Negative: Patient sounds very sick or weak to the triager  • Constipation  • Negative: [1] Abdomen pain is main symptom AND [2] male  • Negative: [1] Abdomen pain is main symptom AND [2] adult female  • Negative: Rectal bleeding or blood in stool is main symptom  • Negative: Rectal pain or itching is main symptom  • Negative: Constipation in a cancer patient who is currently (or recently) receiving chemotherapy or radiation therapy, or cancer patient who has metastatic or end-stage cancer and is receiving palliative care  • Negative: Patient sounds very sick or weak to the triager  • Negative: [1] Vomiting AND [2] abdomen  "looks much more swollen than usual  • Negative: [1] Vomiting AND [2] contains bile (green color)  • Negative: [1] Constant abdominal pain AND [2] present > 2 hours  • Negative: [1] Rectal pain or fullness from fecal impaction (rectum full of stool) AND [2] NOT better after SITZ bath, suppository or enema  • Negative: [1] Intermittent mild abdominal pain AND [2] fever  • Negative: Abdomen is more swollen than usual  • Negative: Leaking stool  • Negative: Unable to have a bowel movement (BM) without manually removing stool (using finger to pull out stool or perform disimpaction)  • Negative: Unable to have a bowel movement (BM) without laxative or enema    Answer Assessment - Initial Assessment Questions  1. SYMPTOM: \"What's the main symptom you're concerned about?\" (e.g., pain, fever, vomiting)      constipation  2. ONSET: \"When did *constipatiojnm start?\"     Since surgery  3.RGERY: \"What surgery was performed?\"     Back surgery  4. DATE of SURGERY: \"When was surgery performed?\"       3/26  5. ANESTHESIA: \" What type of anesthesia did you have?\" (e.g., general, spinal, epidural, local)      general  6. PAIN: \"Is there any pain?\" If Yes, ask: \"How bad is it?\"  (Scale 1-10; or mild, moderate, severe)      slight  7. FEVER: \"Do you have a fever?\" If Yes, ask: \"What is your temperature, how was it measured, and when did it start?\"      denies  8. VOMITING: \"Is there any vomiting?\" If yes, ask: \"How many times?\"      denies  9. BLEEDING: \"Is there any bleeding?\" If Yes, ask: \"How much?\" and \"Where?\"      no  10. OTHER SYMPTOMS: \"Do you have any other symptoms?\" (e.g., drainage from wound, painful urination, constipation)        constipation    Answer Assessment - Initial Assessment Questions  1. STOOL PATTERN OR FREQUENCY: \"How often do you pass bowel movements (BMs)?\"  (Normal range: tid to q 3 days)  \"When was the last BM passed?\"         Frequent before surgery on 3/26  2. STRAINING: \"Do you have to strain to have a " "BM?\"       Not usually  3. RECTAL PAIN: \"Does your rectum hurt when the stool comes out?\" If Yes, ask: \"Do you have hemorrhoids? How bad is the pain?\"  (Scale 1-10; or mild, moderate, severe)      mod  4. STOOL COMPOSITION: \"Are the stools hard?\"       \no stool in  1 week  5. BLOOD ON STOOLS: \"Has there been any blood on the toilet tissue or on the surface of the BM?\" If Yes, ask: \"When was the last time?\"       no  6. CHRONIC CONSTIPATION: \"Is this a new problem for you?\"  If no, ask: \"How long have you had this problem?\" (days, weeks, months)       no  7. CHANGES IN DIET OR HYDRATION: \"Have there been any recent changes in your diet?\" \"How much fluids are you drinking consuming on a daily basis?\"  \"How much have you had to drink today?\"      Surgery on pain meds  8. MEDICATIONS: \"Have you been taking any new medications?\" \"Are you taking any narcotic pain medications?\" (e.g., Vicoden, Percocet, morphine, dilaudid)      Miralax, suppositories  9. LAXATIVES: \"Have you been using any stool softeners, laxatives, or enemas?\"  If yes, ask \"What, how often, and when was the last time?\"  10.ACTIVITY:  \"How much walking do you do every day? on a daily basis?\"  \"Has your activity level decreased in the past week?\"         \ye  11. CAUSE: \"What do you think is causing the constipation?\"         \surgery  12. OTHER SYMPTOMS: \"Do you have any other symptoms?\" (e.g., abdominal pain, bloating, fever, vomiting)       13. MEDICAL HISTORY: \"Do you have a history of hemorrhoids, rectal fissures, or rectal surgery or rectal abscess?\"         no  14. PREGNANCY: \"Is there any chance you are pregnant?\" \"When was your last menstrual period?\"        no    Protocols used: CONSTIPATION-ADULT-AH, POST-OP SYMPTOMS AND QUESTIONS-ADULT-AH      "

## 2023-04-19 ENCOUNTER — HOSPITAL ENCOUNTER (OUTPATIENT)
Dept: GENERAL RADIOLOGY | Facility: HOSPITAL | Age: 78
Discharge: HOME | End: 2023-04-19
Admitting: NEUROLOGICAL SURGERY
Payer: MEDICARE

## 2023-04-19 ENCOUNTER — OFFICE VISIT (OUTPATIENT)
Dept: NEUROSURGERY | Facility: CLINIC | Age: 78
End: 2023-04-19
Payer: MEDICARE

## 2023-04-19 VITALS
WEIGHT: 203.2 LBS | BODY MASS INDEX: 28.45 KG/M2 | HEIGHT: 71 IN | SYSTOLIC BLOOD PRESSURE: 142 MMHG | DIASTOLIC BLOOD PRESSURE: 68 MMHG | TEMPERATURE: 97.1 F

## 2023-04-19 DIAGNOSIS — M54.16 LUMBAR RADICULOPATHY: ICD-10-CM

## 2023-04-19 DIAGNOSIS — M19.90 ARTHRITIS: Primary | ICD-10-CM

## 2023-04-19 DIAGNOSIS — M54.16 LUMBAR RADICULOPATHY, RIGHT: ICD-10-CM

## 2023-04-19 DIAGNOSIS — M43.10 SPONDYLOLISTHESIS, ACQUIRED: ICD-10-CM

## 2023-04-19 DIAGNOSIS — M48.061 SPINAL STENOSIS, LUMBAR REGION, WITHOUT NEUROGENIC CLAUDICATION: ICD-10-CM

## 2023-04-19 PROCEDURE — 99024 POSTOP FOLLOW-UP VISIT: CPT | Performed by: PHYSICIAN ASSISTANT

## 2023-04-19 PROCEDURE — 3077F SYST BP >= 140 MM HG: CPT | Performed by: PHYSICIAN ASSISTANT

## 2023-04-19 PROCEDURE — 1159F MED LIST DOCD IN RCRD: CPT | Performed by: PHYSICIAN ASSISTANT

## 2023-04-19 PROCEDURE — 1160F RVW MEDS BY RX/DR IN RCRD: CPT | Performed by: PHYSICIAN ASSISTANT

## 2023-04-19 PROCEDURE — 72100 X-RAY EXAM L-S SPINE 2/3 VWS: CPT

## 2023-04-19 PROCEDURE — 3078F DIAST BP <80 MM HG: CPT | Performed by: PHYSICIAN ASSISTANT

## 2023-04-19 RX ORDER — POLYETHYLENE GLYCOL 3350, SODIUM SULFATE ANHYDROUS, SODIUM BICARBONATE, SODIUM CHLORIDE, POTASSIUM CHLORIDE 236; 22.74; 6.74; 5.86; 2.97 G/4L; G/4L; G/4L; G/4L; G/4L
POWDER, FOR SOLUTION ORAL
COMMUNITY
Start: 2023-04-03

## 2023-04-19 NOTE — PROGRESS NOTES
Brandon Brooks  1945 04/19/2023  [id number]    CC: Postop follow-up    HPI:  S/P lumbar fusion L5-S1, 3/27/2023 for spondylolisthesis with radiculopathy.    Past Medical History:   Diagnosis Date   • Arthritis    • Cancer     prostate cancer   • GERD (gastroesophageal reflux disease)    • Hearing loss    • Hypertension    • Irregular heart beat    • Low back pain    • PONV (postoperative nausea and vomiting)        Allergies   Allergen Reactions   • Levofloxacin Dizziness         Current Outpatient Medications:   •  acetaminophen (TYLENOL) 325 MG tablet, Take 2 tablets by mouth Every 6 (Six) Hours As Needed for Mild Pain. On rare occassion, Disp: , Rfl:   •  aspirin 81 MG chewable tablet, Chew 1 tablet Daily., Disp: , Rfl:   •  cloNIDine (CATAPRES) 0.1 MG tablet, Take 1 tablet by mouth As Needed. Take one for systolic higher than 165 or diastole higher than 100, Disp: , Rfl:   •  ezetimibe (ZETIA) 10 MG tablet, Take 1 tablet by mouth Daily., Disp: , Rfl:   •  lisinopril-hydrochlorothiazide (PRINZIDE,ZESTORETIC) 20-12.5 MG per tablet, Take 1 tablet by mouth Daily., Disp: , Rfl:   •  metoprolol tartrate (LOPRESSOR) 50 MG tablet, Take 1 tablet by mouth 2 (Two) Times a Day., Disp: , Rfl:   •  oxyCODONE-acetaminophen (PERCOCET) 5-325 MG per tablet, Take 1 tablet by mouth 3 (Three) Times a Day As Needed (Pain)., Disp: 20 tablet, Rfl: 0  •  PEG 3350-KCl-NaBcb-NaCl-NaSulf (PEG-3350/Electrolytes) 236 g reconstituted solution, , Disp: , Rfl:     Review of Systems   Constitutional: Positive for diaphoresis. Negative for activity change, appetite change, chills, fatigue, fever and unexpected weight change.   HENT: Positive for hearing loss and tinnitus. Negative for congestion, dental problem, drooling, ear discharge, ear pain, facial swelling, mouth sores, nosebleeds, postnasal drip, rhinorrhea, sinus pressure, sinus pain, sneezing, sore throat, trouble swallowing and voice change.    Eyes: Negative for photophobia,  "pain, discharge, redness, itching and visual disturbance.   Respiratory: Negative for apnea, cough, choking, chest tightness, shortness of breath, wheezing and stridor.    Cardiovascular: Positive for palpitations. Negative for chest pain and leg swelling.   Gastrointestinal: Positive for constipation. Negative for abdominal distention, abdominal pain, anal bleeding, blood in stool, diarrhea, nausea, rectal pain and vomiting.   Endocrine: Negative for cold intolerance, heat intolerance, polydipsia, polyphagia and polyuria.   Genitourinary: Negative for decreased urine volume, difficulty urinating, dysuria, enuresis, flank pain, frequency, genital sores, hematuria, penile discharge, penile pain, penile swelling, scrotal swelling, testicular pain and urgency.   Musculoskeletal: Positive for arthralgias and back pain. Negative for gait problem, joint swelling, myalgias, neck pain and neck stiffness.   Skin: Negative for color change, pallor, rash and wound.   Allergic/Immunologic: Negative for environmental allergies, food allergies and immunocompromised state.   Neurological: Positive for dizziness and numbness. Negative for tremors, seizures, syncope, facial asymmetry, speech difficulty, weakness, light-headedness and headaches.   Hematological: Negative for adenopathy. Does not bruise/bleed easily.   Psychiatric/Behavioral: Negative for agitation, behavioral problems, confusion, decreased concentration, dysphoric mood, hallucinations, self-injury, sleep disturbance and suicidal ideas. The patient is not nervous/anxious and is not hyperactive.          PE:  /68 (BP Location: Right arm, Patient Position: Sitting, Cuff Size: Adult)   Temp 97.1 °F (36.2 °C) (Infrared)   Ht 180.3 cm (71\")   Wt 92.2 kg (203 lb 3.2 oz)   BMI 28.34 kg/m²   Heart- RRR  Lungs- no wheezing, normal expansion    Wound-well-healed    Neurologic Exam   Gait is normal    MDM   Activities and restrictions were discussed.  Wound care was " discussed with the patient.  Diagnoses and all orders for this visit:    1. Arthritis (Primary)    2. Spondylolisthesis, acquired    3. Lumbar radiculopathy         Any copied data from previous notes included in the (1) HPI, (2) PE, (3) MDM and/or Assessment and Plan has been reviewed and is accurate as of 4/19/2023.    Carlie Nelson, PAC

## 2023-05-17 ENCOUNTER — OFFICE VISIT (OUTPATIENT)
Dept: NEUROSURGERY | Facility: CLINIC | Age: 78
End: 2023-05-17
Payer: MEDICARE

## 2023-05-17 VITALS — HEIGHT: 71 IN | BODY MASS INDEX: 28.14 KG/M2 | TEMPERATURE: 98 F | WEIGHT: 201 LBS

## 2023-05-17 DIAGNOSIS — Z98.1 S/P LUMBAR SPINAL FUSION: Primary | ICD-10-CM

## 2023-05-17 DIAGNOSIS — M43.16 SPONDYLOLISTHESIS OF LUMBAR REGION: ICD-10-CM

## 2023-05-17 NOTE — PROGRESS NOTES
Patient: Brandon Brooks  : 1945    Primary Care Provider: Juan Sosa MD    Requesting Provider: As above        History    Chief Complaint: Low back and right leg pain.    History of Present Illness: Mr. Wang an 78-year-old gentleman who presented with the above-noted complaints and was noted to have an L5-S1 spondylolisthesis and associated radiculopathy.  As such, on 3/27/2023 underwent L5-S1 PLIF.  He is still quite sore.  He has a little bit of pain in his right leg.  He has been modestly active.    Review of Systems   Constitutional: Negative for activity change, appetite change, chills, diaphoresis, fatigue, fever and unexpected weight change.   HENT: Negative for congestion, dental problem, drooling, ear discharge, ear pain, facial swelling, hearing loss, mouth sores, nosebleeds, postnasal drip, rhinorrhea, sinus pressure, sinus pain, sneezing, sore throat, tinnitus, trouble swallowing and voice change.    Eyes: Negative for photophobia, pain, discharge, redness, itching and visual disturbance.   Respiratory: Negative for apnea, cough, choking, chest tightness, shortness of breath, wheezing and stridor.    Cardiovascular: Negative for chest pain, palpitations and leg swelling.   Gastrointestinal: Negative for abdominal distention, abdominal pain, anal bleeding, blood in stool, constipation, diarrhea, nausea, rectal pain and vomiting.   Endocrine: Negative for cold intolerance, heat intolerance, polydipsia, polyphagia and polyuria.   Genitourinary: Negative for decreased urine volume, difficulty urinating, dysuria, enuresis, flank pain, frequency, genital sores, hematuria and urgency.   Musculoskeletal: Negative for arthralgias, back pain, gait problem, joint swelling, myalgias, neck pain and neck stiffness.   Skin: Negative for color change, pallor, rash and wound.   Allergic/Immunologic: Negative for environmental allergies, food allergies and immunocompromised state.   Neurological:  "Negative for dizziness, tremors, seizures, syncope, facial asymmetry, speech difficulty, weakness, light-headedness, numbness and headaches.   Hematological: Negative for adenopathy. Does not bruise/bleed easily.   Psychiatric/Behavioral: Negative for agitation, behavioral problems, confusion, decreased concentration, dysphoric mood, hallucinations, self-injury, sleep disturbance and suicidal ideas. The patient is not nervous/anxious and is not hyperactive.    All other systems reviewed and are negative.      The patient's past medical history, past surgical history, family history, and social history have been reviewed at length in the electronic medical record.      Physical Exam:   Temp 98 °F (36.7 °C)   Ht 180.3 cm (70.98\")   Wt 91.2 kg (201 lb)   BMI 28.05 kg/m²   Patient moves about independently but somewhat uncomfortably.  Lumbar incision looks great.    Medical Decision Making    Data Review:   (All imaging studies were personally reviewed unless stated otherwise)  Plain films dated 4/19/2023 demonstrate excellent position of his construct at L5-S1.    Diagnosis:   L5-S1 spondylolisthesis and stenosis with radiculopathy status post PLIF.    Treatment Options:   Overall the patient is doing well.  I have referred him for some physical therapy.  He will steadily increase his activity.  He will follow-up in our clinic in about 3 months to check on his progress.  At the visit thereafter we will repeat some plain films.       Diagnosis Plan   1. S/P lumbar spinal fusion        2. Spondylolisthesis of lumbar region            Scribed for Lenin Sood MD by PHUC Howe 5/17/2023 10:20 EDT      I, Dr. Sood, personally performed the services described in the documentation, as scribed in my presence, and it is both accurate and complete.  "

## 2023-08-18 ENCOUNTER — OFFICE VISIT (OUTPATIENT)
Dept: NEUROSURGERY | Facility: CLINIC | Age: 78
End: 2023-08-18
Payer: MEDICARE

## 2023-08-18 VITALS
BODY MASS INDEX: 28.53 KG/M2 | DIASTOLIC BLOOD PRESSURE: 78 MMHG | TEMPERATURE: 96.8 F | WEIGHT: 203.8 LBS | HEIGHT: 71 IN | SYSTOLIC BLOOD PRESSURE: 138 MMHG

## 2023-08-18 DIAGNOSIS — Z98.1 S/P LUMBAR SPINAL FUSION: Primary | ICD-10-CM

## 2023-08-18 DIAGNOSIS — M43.16 SPONDYLOLISTHESIS OF LUMBAR REGION: ICD-10-CM

## 2023-08-18 DIAGNOSIS — M43.10 SPONDYLOLISTHESIS, ACQUIRED: ICD-10-CM

## 2023-08-18 DIAGNOSIS — M54.16 LUMBAR RADICULOPATHY: ICD-10-CM

## 2023-08-18 DIAGNOSIS — M19.90 ARTHRITIS: ICD-10-CM

## 2023-08-18 NOTE — PROGRESS NOTES
Patient: Brandon Brooks  : 1945  Chart #: [id number]    Date of Service: 23    CHIEF COMPLAINT: Low back and right leg pain    History of Present Illness Patient is a very pleasant 77-year-old gentleman who is retired from the [employer] business.  He has had back difficulties dating back to the 1960s.  He underwent surgery with Dr. Rodriguez in the s.  More recently he presented with low back and right leg pain and was noted to have an L5-S1 spondylolisthesis with associated radiculopathy.  He has been a bit slow to bounce back.  He is currently in physical therapy and is noticing improvements.  He still has pain down the right leg.  He has inquired about trying some golf.    Past Medical History:   Diagnosis Date    Arthritis     Cancer     prostate cancer    GERD (gastroesophageal reflux disease)     Hearing loss     Hypertension     Irregular heart beat     Low back pain     PONV (postoperative nausea and vomiting)          Current Outpatient Medications:     acetaminophen (TYLENOL) 325 MG tablet, Take 2 tablets by mouth Every 6 (Six) Hours As Needed for Mild Pain. On rare occassion, Disp: , Rfl:     aspirin 81 MG chewable tablet, Chew 1 tablet Daily., Disp: , Rfl:     cloNIDine (CATAPRES) 0.1 MG tablet, Take 1 tablet by mouth As Needed. Take one for systolic higher than 165 or diastole higher than 100, Disp: , Rfl:     ezetimibe (ZETIA) 10 MG tablet, Take 1 tablet by mouth Daily., Disp: , Rfl:     lisinopril-hydrochlorothiazide (PRINZIDE,ZESTORETIC) 20-12.5 MG per tablet, Take 1 tablet by mouth Daily., Disp: , Rfl:     metoprolol tartrate (LOPRESSOR) 50 MG tablet, Take 1 tablet by mouth 2 (Two) Times a Day., Disp: , Rfl:     oxyCODONE-acetaminophen (PERCOCET) 5-325 MG per tablet, Take 1 tablet by mouth 3 (Three) Times a Day As Needed (Pain). (Patient taking differently: Take 1 tablet by mouth As Needed (Pain).), Disp: 20 tablet, Rfl: 0    PEG 3350-KCl-NaBcb-NaCl-NaSulf (PEG-3350/Electrolytes) 236 g  reconstituted solution, , Disp: , Rfl:     Past Surgical History:   Procedure Laterality Date    CATARACT EXTRACTION Bilateral     HERNIA REPAIR      LUMBAR DISC SURGERY  1990    Dr cameron    LUMBAR LAMINECTOMY WITH FUSION N/A 3/27/2023    Procedure: LUMBAR FUSION DECOMPRESSON WITH PEDICLE SCREWS L5-S1;  Surgeon: Lenin Sood MD;  Location: [facility];  Service: Neurosurgery;  Laterality: N/A;    PROSTATECTOMY         Social History     Socioeconomic History    Marital status:    Tobacco Use    Smoking status: Never    Smokeless tobacco: Never   Vaping Use    Vaping Use: Never used   Substance and Sexual Activity    Alcohol use: Never    Drug use: Never    Sexual activity: Defer         Review of Systems   Constitutional:  Positive for fatigue. Negative for activity change, appetite change, chills, diaphoresis, fever and unexpected weight change.   HENT:  Negative for congestion, dental problem, drooling, ear discharge, ear pain, facial swelling, hearing loss, mouth sores, nosebleeds, postnasal drip, rhinorrhea, sinus pressure, sinus pain, sneezing, sore throat, tinnitus, trouble swallowing and voice change.    Eyes:  Positive for itching and visual disturbance. Negative for photophobia, pain, discharge and redness.   Respiratory:  Positive for chest tightness. Negative for apnea, cough, choking, shortness of breath, wheezing and stridor.    Cardiovascular:  Positive for chest pain. Negative for palpitations and leg swelling.   Gastrointestinal:  Negative for abdominal distention, abdominal pain, anal bleeding, blood in stool, constipation, diarrhea, nausea, rectal pain and vomiting.   Endocrine: Negative for cold intolerance, heat intolerance, polydipsia, polyphagia and polyuria.   Genitourinary:  Negative for decreased urine volume, difficulty urinating, dysuria, enuresis, flank pain, frequency, genital sores, hematuria and urgency.   Musculoskeletal:  Positive for back pain. Negative for arthralgias,  "gait problem, joint swelling, myalgias, neck pain and neck stiffness.   Skin:  Negative for color change, pallor, rash and wound.   Allergic/Immunologic: Negative for environmental allergies, food allergies and immunocompromised state.   Neurological:  Positive for dizziness and light-headedness. Negative for tremors, seizures, syncope, facial asymmetry, speech difficulty, weakness, numbness and headaches.   Hematological:  Negative for adenopathy. Does not bruise/bleed easily.   Psychiatric/Behavioral:  Negative for agitation, behavioral problems, confusion, decreased concentration, dysphoric mood, hallucinations, self-injury, sleep disturbance and suicidal ideas. The patient is not nervous/anxious and is not hyperactive.    All other systems reviewed and are negative.    Objective   Vital Signs: Blood pressure 138/78, temperature 96.8 øF (36 øC), temperature source Infrared, height 180.3 cm (70.98\"), weight 92.4 kg (203 lb 12.8 oz).  Physical Exam  Vitals and nursing note reviewed.   Constitutional:       General: He is not in acute distress.     Appearance: He is well-developed.   HENT:      Head: Normocephalic and atraumatic.   Eyes:      Pupils: Pupils are equal, round, and reactive to light.   Cardiovascular:      Heart sounds: Normal heart sounds.   Pulmonary:      Breath sounds: Normal breath sounds.   Psychiatric:         Behavior: Behavior normal.         Thought Content: Thought content normal.   Musculoskeletal:     Strength is intact in upper and lower extremities to direct testing.     Station and gait are normal.     Straight leg raising is negative.   Neurologic:     Muscle tone is normal throughout.     Coordination is intact.     Deep tendon reflexes: Diminished in the lower extremity.     Sensation is intact to light touch throughout.     Patient is oriented to person, place, and time.         Assessment & Plan   Diagnosis:  1.  L5-S1 spondylolisthesis and stenosis with radiculopathy status post " PLIF  2.  Chronic back pain    Medical Decision Making: Patient is showing modest improvement with physical therapy.  He has not completed his sessions yet.  I encouraged him to continue some low impact exercises and walking on his own at home.  He asked about going back to golf which is reasonable as tolerated.  He will follow-up in 3 months with another set of plain films of the lumbar spine.    Diagnoses and all orders for this visit:    1. S/P lumbar spinal fusion (Primary)  -     XR Spine Lumbar AP & Lateral; Future    2. Spondylolisthesis of lumbar region  -     XR Spine Lumbar AP & Lateral; Future    3. Lumbar radiculopathy    4. Spondylolisthesis, acquired    5. Arthritis                      Yumiko Aguilera PA-C  Patient Care Team:  Juan Sosa MD as PCP - General (Family Medicine)

## 2023-11-07 ENCOUNTER — OFFICE VISIT (OUTPATIENT)
Dept: NEUROSURGERY | Facility: CLINIC | Age: 78
End: 2023-11-07
Payer: MEDICARE

## 2023-11-07 ENCOUNTER — HOSPITAL ENCOUNTER (OUTPATIENT)
Dept: GENERAL RADIOLOGY | Facility: HOSPITAL | Age: 78
Discharge: HOME | End: 2023-11-07
Admitting: PHYSICIAN ASSISTANT
Payer: MEDICARE

## 2023-11-07 VITALS
DIASTOLIC BLOOD PRESSURE: 78 MMHG | SYSTOLIC BLOOD PRESSURE: 130 MMHG | TEMPERATURE: 97.5 F | HEIGHT: 71 IN | BODY MASS INDEX: 28.19 KG/M2 | WEIGHT: 201.4 LBS

## 2023-11-07 DIAGNOSIS — Z98.1 S/P LUMBAR SPINAL FUSION: ICD-10-CM

## 2023-11-07 DIAGNOSIS — Z98.1 S/P LUMBAR SPINAL FUSION: Primary | ICD-10-CM

## 2023-11-07 DIAGNOSIS — M54.16 LUMBAR RADICULOPATHY: ICD-10-CM

## 2023-11-07 DIAGNOSIS — M43.16 SPONDYLOLISTHESIS OF LUMBAR REGION: ICD-10-CM

## 2023-11-07 DIAGNOSIS — M54.9 MECHANICAL BACK PAIN: ICD-10-CM

## 2023-11-07 DIAGNOSIS — M54.16 LUMBAR RADICULOPATHY, RIGHT: ICD-10-CM

## 2023-11-07 PROCEDURE — 3075F SYST BP GE 130 - 139MM HG: CPT | Performed by: PHYSICIAN ASSISTANT

## 2023-11-07 PROCEDURE — 3078F DIAST BP <80 MM HG: CPT | Performed by: PHYSICIAN ASSISTANT

## 2023-11-07 PROCEDURE — 99213 OFFICE O/P EST LOW 20 MIN: CPT | Performed by: PHYSICIAN ASSISTANT

## 2023-11-07 PROCEDURE — 72100 X-RAY EXAM L-S SPINE 2/3 VWS: CPT

## 2023-11-07 RX ORDER — METOPROLOL SUCCINATE 25 MG/1
25 TABLET, EXTENDED RELEASE ORAL DAILY
COMMUNITY
Start: 2023-08-29 | End: 2024-08-28

## 2023-11-07 NOTE — PROGRESS NOTES
Patient: Brandon Brooks  : 1945  Chart #: [id number]    Date of Service: 23    CHIEF COMPLAINT: Low back and right leg pain    History of Present Illness Patient is a very pleasant 78-year-old gentleman who is retired from the [employer] business.  He has had back difficulties dating back to the 1960s.  He underwent surgery with Dr. Rodriguez in the s.  More recently he presented with low back and right leg pain and was noted to have an L5-S1 spondylolisthesis with associated radiculopathy.  On 3/27/2023 he underwent L5-S1 PLIF.  He has been a bit slow to bounce back.  He was treated with physical therapy and did not find it to be particularly effective.  He uses some cream on the right low back which helps with occasional pain.  He is not as active as he once was.  Overall he is showing improvements and is pleased.    Past Medical History:   Diagnosis Date    Arthritis     Cancer     prostate cancer    GERD (gastroesophageal reflux disease)     Hearing loss     Hypertension     Irregular heart beat     Low back pain     PONV (postoperative nausea and vomiting)          Current Outpatient Medications:     acetaminophen (TYLENOL) 325 MG tablet, Take 2 tablets by mouth Every 6 (Six) Hours As Needed for Mild Pain. On rare occassion, Disp: , Rfl:     aspirin 81 MG chewable tablet, Chew 1 tablet Daily., Disp: , Rfl:     cloNIDine (CATAPRES) 0.1 MG tablet, Take 1 tablet by mouth As Needed. Take one for systolic higher than 165 or diastole higher than 100, Disp: , Rfl:     ezetimibe (ZETIA) 10 MG tablet, Take 1 tablet by mouth Daily., Disp: , Rfl:     lisinopril-hydrochlorothiazide (PRINZIDE,ZESTORETIC) 20-12.5 MG per tablet, Take 1 tablet by mouth Daily., Disp: , Rfl:     metoprolol succinate XL (TOPROL-XL) 25 MG 24 hr tablet, Take 1 tablet by mouth Daily., Disp: , Rfl:     PEG 3350-KCl-NaBcb-NaCl-NaSulf (PEG-3350/Electrolytes) 236 g reconstituted solution, , Disp: , Rfl:     Past Surgical History:   Procedure  Laterality Date    CATARACT EXTRACTION Bilateral     HERNIA REPAIR      LUMBAR DISC SURGERY  1990    Dr cameron    LUMBAR LAMINECTOMY WITH FUSION N/A 3/27/2023    Procedure: LUMBAR FUSION DECOMPRESSON WITH PEDICLE SCREWS L5-S1;  Surgeon: Lenin Sood MD;  Location: [facility];  Service: Neurosurgery;  Laterality: N/A;    PROSTATECTOMY         Social History     Socioeconomic History    Marital status:    Tobacco Use    Smoking status: Never    Smokeless tobacco: Never   Vaping Use    Vaping Use: Never used   Substance and Sexual Activity    Alcohol use: Never    Drug use: Never    Sexual activity: Defer         Review of Systems   Constitutional:  Positive for fatigue. Negative for activity change, appetite change, chills, diaphoresis, fever and unexpected weight change.   HENT:  Negative for congestion, dental problem, drooling, ear discharge, ear pain, facial swelling, hearing loss, mouth sores, nosebleeds, postnasal drip, rhinorrhea, sinus pressure, sinus pain, sneezing, sore throat, tinnitus, trouble swallowing and voice change.    Eyes:  Positive for itching and visual disturbance. Negative for photophobia, pain, discharge and redness.   Respiratory:  Positive for chest tightness. Negative for apnea, cough, choking, shortness of breath, wheezing and stridor.    Cardiovascular:  Positive for chest pain. Negative for palpitations and leg swelling.   Gastrointestinal:  Negative for abdominal distention, abdominal pain, anal bleeding, blood in stool, constipation, diarrhea, nausea, rectal pain and vomiting.   Endocrine: Negative for cold intolerance, heat intolerance, polydipsia, polyphagia and polyuria.   Genitourinary:  Negative for decreased urine volume, difficulty urinating, dysuria, enuresis, flank pain, frequency, genital sores, hematuria and urgency.   Musculoskeletal:  Positive for back pain. Negative for arthralgias, gait problem, joint swelling, myalgias, neck pain and neck stiffness.   Skin:   "Negative for color change, pallor, rash and wound.   Allergic/Immunologic: Negative for environmental allergies, food allergies and immunocompromised state.   Neurological:  Positive for dizziness and light-headedness. Negative for tremors, seizures, syncope, facial asymmetry, speech difficulty, weakness, numbness and headaches.   Hematological:  Negative for adenopathy. Does not bruise/bleed easily.   Psychiatric/Behavioral:  Negative for agitation, behavioral problems, confusion, decreased concentration, dysphoric mood, hallucinations, self-injury, sleep disturbance and suicidal ideas. The patient is not nervous/anxious and is not hyperactive.    All other systems reviewed and are negative.      Objective   Vital Signs: Blood pressure 130/78, temperature 97.5 °F (36.4 °C), temperature source Infrared, height 180.3 cm (70.98\"), weight 91.4 kg (201 lb 6.4 oz).  Physical Exam  Vitals and nursing note reviewed.   Constitutional:       General: He is not in acute distress.     Appearance: He is well-developed.   HENT:      Head: Normocephalic and atraumatic.   Eyes:      Pupils: Pupils are equal, round, and reactive to light.   Cardiovascular:      Heart sounds: Normal heart sounds.   Pulmonary:      Breath sounds: Normal breath sounds.   Psychiatric:         Behavior: Behavior normal.         Thought Content: Thought content normal.     Musculoskeletal:     Strength is intact in upper and lower extremities to direct testing.     Station and gait are normal.     Straight leg raising is negative.   Neurologic:     Muscle tone is normal throughout.     Coordination is intact.     Deep tendon reflexes: Diminished in the lower extremity.     Sensation is intact to light touch throughout.     Patient is oriented to person, place, and time.         Assessment & Plan   Diagnosis:  1.  L5-S1 spondylolisthesis and stenosis with radiculopathy status post PLIF  2.  Chronic back pain    Medical Decision Making: Overall Mr. Brooks has " done well. It has taken him longer than he anticipated to bounce back. He still has some left sided sciatic type pain every now and then. His walking has really improved; although, he admits he is not as active as he should be. He plans to slowly get back into golf early next year.  I will see him back in about 6 months with plain films. This will likely be a final visit.     Diagnoses and all orders for this visit:    1. S/P lumbar spinal fusion (Primary)  -     XR Spine Lumbar AP & Lateral; Future    2. Spondylolisthesis of lumbar region    3. Lumbar radiculopathy    4. Lumbar radiculopathy, right    5. Mechanical back pain                        Yumiko Aguilera PA-C  Patient Care Team:  Juan Sosa MD as PCP - General (Family Medicine)

## 2024-03-04 ENCOUNTER — OFFICE VISIT (OUTPATIENT)
Dept: NEUROSURGERY | Facility: CLINIC | Age: 79
End: 2024-03-04
Payer: MEDICARE

## 2024-03-04 ENCOUNTER — HOSPITAL ENCOUNTER (OUTPATIENT)
Dept: GENERAL RADIOLOGY | Facility: HOSPITAL | Age: 79
Discharge: HOME | End: 2024-03-04
Admitting: PHYSICIAN ASSISTANT
Payer: MEDICARE

## 2024-03-04 VITALS
BODY MASS INDEX: 28.36 KG/M2 | SYSTOLIC BLOOD PRESSURE: 112 MMHG | WEIGHT: 202.6 LBS | DIASTOLIC BLOOD PRESSURE: 62 MMHG | HEIGHT: 71 IN | TEMPERATURE: 97.7 F

## 2024-03-04 DIAGNOSIS — Z98.1 S/P LUMBAR SPINAL FUSION: ICD-10-CM

## 2024-03-04 DIAGNOSIS — M43.16 SPONDYLOLISTHESIS OF LUMBAR REGION: Primary | ICD-10-CM

## 2024-03-04 DIAGNOSIS — M54.16 LUMBAR RADICULOPATHY, RIGHT: ICD-10-CM

## 2024-03-04 PROCEDURE — 72100 X-RAY EXAM L-S SPINE 2/3 VWS: CPT

## 2024-03-04 PROCEDURE — 3074F SYST BP LT 130 MM HG: CPT | Performed by: PHYSICIAN ASSISTANT

## 2024-03-04 PROCEDURE — 99213 OFFICE O/P EST LOW 20 MIN: CPT | Performed by: PHYSICIAN ASSISTANT

## 2024-03-04 PROCEDURE — 3078F DIAST BP <80 MM HG: CPT | Performed by: PHYSICIAN ASSISTANT

## 2024-03-04 NOTE — PROGRESS NOTES
"Patient: Brandon Brooks  : 1945  Chart #: [id number]    Date of Service: 3/4/2024    CHIEF COMPLAINT: Low back and right leg pain    History of Present Illness Patient is a very pleasant 78-year-old gentleman who is retired from the [employer] business.  He has had back difficulties dating back to the 1960s.  He underwent surgery with Dr. Rodriguez in the s.  More recently he presented with low back and right leg pain and was noted to have an L5-S1 spondylolisthesis with associated radiculopathy.  On 3/27/2023 he underwent L5-S1 PLIF.  He has been a bit slow to bounce back.  He was treated with physical therapy and did not find it to be particularly effective.  He uses some cream on the right low back which helps with occasional pain.  He is not as active as he once was.  Overall he is showing improvements and is pleased. He admits to being rather sedentary and has not \"tested\" his back yet. He hopes to get back into golf this spring. He has had ongoing sensory alteration in the right leg that never really improved after surgery. He complains of right hip pain on occasion.     Past Medical History:   Diagnosis Date    Arthritis     Cancer     prostate cancer    GERD (gastroesophageal reflux disease)     Hearing loss     Hypertension     Irregular heart beat     Low back pain     PONV (postoperative nausea and vomiting)          Current Outpatient Medications:     acetaminophen (TYLENOL) 325 MG tablet, Take 2 tablets by mouth Every 6 (Six) Hours As Needed for Mild Pain. On rare occassion, Disp: , Rfl:     aspirin 81 MG chewable tablet, Chew 1 tablet Daily., Disp: , Rfl:     cloNIDine (CATAPRES) 0.1 MG tablet, Take 1 tablet by mouth As Needed. Take one for systolic higher than 165 or diastole higher than 100, Disp: , Rfl:     ezetimibe (ZETIA) 10 MG tablet, Take 1 tablet by mouth Daily., Disp: , Rfl:     lisinopril-hydrochlorothiazide (PRINZIDE,ZESTORETIC) 20-12.5 MG per tablet, Take 1 tablet by mouth Daily., Disp: " , Rfl:     metoprolol succinate XL (TOPROL-XL) 25 MG 24 hr tablet, Take 1 tablet by mouth Daily., Disp: , Rfl:     PEG 3350-KCl-NaBcb-NaCl-NaSulf (PEG-3350/Electrolytes) 236 g reconstituted solution, , Disp: , Rfl:     Past Surgical History:   Procedure Laterality Date    CATARACT EXTRACTION Bilateral     HERNIA REPAIR      LUMBAR DISC SURGERY  1990    Dr cameron    LUMBAR LAMINECTOMY WITH FUSION N/A 3/27/2023    Procedure: LUMBAR FUSION DECOMPRESSON WITH PEDICLE SCREWS L5-S1;  Surgeon: Lenin Sood MD;  Location: [facility];  Service: Neurosurgery;  Laterality: N/A;    PROSTATECTOMY         Social History     Socioeconomic History    Marital status:    Tobacco Use    Smoking status: Never    Smokeless tobacco: Never   Vaping Use    Vaping status: Never Used   Substance and Sexual Activity    Alcohol use: Never    Drug use: Never    Sexual activity: Defer         Review of Systems   Constitutional:  Positive for fatigue. Negative for activity change, appetite change, chills, diaphoresis, fever and unexpected weight change.   HENT:  Negative for congestion, dental problem, drooling, ear discharge, ear pain, facial swelling, hearing loss, mouth sores, nosebleeds, postnasal drip, rhinorrhea, sinus pressure, sinus pain, sneezing, sore throat, tinnitus, trouble swallowing and voice change.    Eyes:  Positive for itching and visual disturbance. Negative for photophobia, pain, discharge and redness.   Respiratory:  Positive for chest tightness. Negative for apnea, cough, choking, shortness of breath, wheezing and stridor.    Cardiovascular:  Positive for chest pain. Negative for palpitations and leg swelling.   Gastrointestinal:  Negative for abdominal distention, abdominal pain, anal bleeding, blood in stool, constipation, diarrhea, nausea, rectal pain and vomiting.   Endocrine: Negative for cold intolerance, heat intolerance, polydipsia, polyphagia and polyuria.   Genitourinary:  Negative for decreased urine  "volume, difficulty urinating, dysuria, enuresis, flank pain, frequency, genital sores, hematuria and urgency.   Musculoskeletal:  Positive for back pain. Negative for arthralgias, gait problem, joint swelling, myalgias, neck pain and neck stiffness.   Skin:  Negative for color change, pallor, rash and wound.   Allergic/Immunologic: Negative for environmental allergies, food allergies and immunocompromised state.   Neurological:  Positive for dizziness and light-headedness. Negative for tremors, seizures, syncope, facial asymmetry, speech difficulty, weakness, numbness and headaches.   Hematological:  Negative for adenopathy. Does not bruise/bleed easily.   Psychiatric/Behavioral:  Negative for agitation, behavioral problems, confusion, decreased concentration, dysphoric mood, hallucinations, self-injury, sleep disturbance and suicidal ideas. The patient is not nervous/anxious and is not hyperactive.    All other systems reviewed and are negative.      Objective   Vital Signs: Blood pressure 112/62, temperature 97.7 °F (36.5 °C), temperature source Infrared, height 180.3 cm (70.98\"), weight 91.9 kg (202 lb 9.6 oz).  Physical Exam  Vitals and nursing note reviewed.   Constitutional:       General: He is not in acute distress.     Appearance: He is well-developed.   HENT:      Head: Normocephalic and atraumatic.   Eyes:      Pupils: Pupils are equal, round, and reactive to light.   Cardiovascular:      Heart sounds: Normal heart sounds.   Pulmonary:      Breath sounds: Normal breath sounds.   Psychiatric:         Behavior: Behavior normal.         Thought Content: Thought content normal.     Musculoskeletal:     Strength is intact in upper and lower extremities to direct testing.     Station and gait are normal.     Straight leg raising is negative.   Neurologic:     Muscle tone is normal throughout.     Coordination is intact.     Deep tendon reflexes: Diminished in the lower extremity.     Sensation is intact to " light touch throughout.     Patient is oriented to person, place, and time.         Assessment & Plan   Diagnosis:  1.  L5-S1 spondylolisthesis and stenosis with radiculopathy status post PLIF  2.  Chronic back pain    Medical Decision Making: Overall Mr. Brooks has done well. It has taken him longer than he anticipated to bounce back. He still has some right sided sciatic type pain every now and then. His walking has really improved; although, he admits he is not as active as he should be. He plans to slowly get back into golf this spring. If he has any issues, he will notify our office. Follow up in our clinic as needed.       There are no diagnoses linked to this encounter.                      Yumiko Aguilera PA-C  Patient Care Team:  Juan Sosa MD as PCP - General (Family Medicine)  Lenin Sood MD as Surgeon (Neurosurgery)  Juan Sosa MD as Primary Care Provider (Family Medicine)  Xiang Peacock PA as Referring Physician (Physician Assistant)

## 2024-08-12 ENCOUNTER — TELEPHONE (OUTPATIENT)
Dept: NEUROSURGERY | Facility: CLINIC | Age: 79
End: 2024-08-12
Payer: MEDICARE

## 2024-08-12 NOTE — TELEPHONE ENCOUNTER
Provider: JESSICA RAWLS    Caller: PATIENT    Relationship to Patient: SELF    Pharmacy: [facility]    Phone Number: [phone]    Reason for Call: PATIENT CALLED IN AND STATES THAT HE IS HAVING A PAIN FROM THE RIGHT MIDDLE SIDE OF HIS  BACK DOWN HIS RIGHT LEG TO HIS FOOT-PT STATES THAT HE HAS PAIN A LITTLE BELOW KIDNEY AREA IN HIS BACK-PATIENT ADVISED THAT HE HAS BEEN CHECKED FOR HIS KIDNEY AND THEY ADVISED IT IS NOT RELATED-    When was the patient last seen: 03/04/24    When did it start: SINCE SURGERY THE SCIATICA PAIN HAS BEEN THERE AND HAS NOT IMPROVED-BELOW THE KIDNEY PAIN IS ONGOING FOR 3 MONTHS    Where is it located: RIGHT SIDE MIDDLE BACK- RIGHT SIDE BELOW KIDNEY-RIGHT LEG AND RT FOOT    Characteristics of symptom/severity: GETTING OUT OF BED 10/10 BRINGS PATIENT ALMOST TO HIS KNEES- MOVING AROUND STAYS AROUND 5/10    Timing- Is it constant or intermittent: SPASMS AND PAIN IN THE MORNING-COMES AND GOES THROUGHOUT THE DAY    What makes it worse: CAN NOT LIFT ANYTHING-ACTIVITY OUTSIDE OF HOME SUCH AS YARD WORK    What makes it better: PATIENT STATES HE TAKES TYLENOL AND USES A BACK AND LEG CREAM-HE STATES IT MAY HELP JUST A LITTLE    What therapies/medications have you tried: CREAM,TYLENOL

## 2024-08-21 ENCOUNTER — OFFICE VISIT (OUTPATIENT)
Dept: NEUROSURGERY | Facility: CLINIC | Age: 79
End: 2024-08-21
Payer: MEDICARE

## 2024-08-21 VITALS — WEIGHT: 201.2 LBS | TEMPERATURE: 97 F | HEIGHT: 71 IN | BODY MASS INDEX: 28.17 KG/M2

## 2024-08-21 DIAGNOSIS — M54.16 LUMBAR RADICULOPATHY, RIGHT: Primary | ICD-10-CM

## 2024-08-21 DIAGNOSIS — Z98.1 S/P LUMBAR SPINAL FUSION: ICD-10-CM

## 2024-08-21 PROCEDURE — 1160F RVW MEDS BY RX/DR IN RCRD: CPT

## 2024-08-21 PROCEDURE — 1159F MED LIST DOCD IN RCRD: CPT

## 2024-08-21 PROCEDURE — 99213 OFFICE O/P EST LOW 20 MIN: CPT

## 2024-08-21 RX ORDER — CYCLOBENZAPRINE HCL 10 MG
10 TABLET ORAL 3 TIMES DAILY PRN
Qty: 30 TABLET | Refills: 0 | Status: SHIPPED | OUTPATIENT
Start: 2024-08-21

## 2024-08-21 NOTE — PROGRESS NOTES
Patient: Brandon Brooks  : 1945  Chart #: [id number]    Date of Service: 2024    Chief Complaint   Patient presents with    Back Pain    Leg Pain     Right       HPI: Mr. Brooks is a pleasant 79-year-old gentleman who is retired from the [employer] business.  He has had back difficulties dating back to the 1960s.  He underwent surgery with Dr. Rodriguez in the s.  More recently he presented with low back and right leg pain and was noted to have an L5-S1 spondylolisthesis with associated radiculopathy.  On 3/27/2023 he underwent L5-S1 PLIF by Dr. Sood.  He has been a bit slow to bounce back, and never really reported resolution of his right leg pain postoperatively. He tried physical therapy last year and did not find it to be effective. He has used topical creams in the past.     Today, Mr. Brooks primarily complains of a right sided back pain that he's had for the last three months. He states that when he gets out of bed, he experiences a muscle spasm in the right side of his back that gradually improves with movement. He also continues to endorse the right leg pain and states it is worsening. He currently takes two 500 mg Tylenol tablets in the morning and at night. He denies numbness or tingling in the groin area, incontinence, or left leg symptoms.       Review of Systems   Constitutional:  Negative for activity change, appetite change, chills, diaphoresis, fatigue, fever and unexpected weight change.   HENT:  Negative for congestion, dental problem, drooling, ear discharge, ear pain, facial swelling, hearing loss, mouth sores, nosebleeds, postnasal drip, rhinorrhea, sinus pressure, sinus pain, sneezing, sore throat, tinnitus, trouble swallowing and voice change.    Eyes:  Negative for photophobia, pain, discharge, redness, itching and visual disturbance.   Respiratory:  Negative for apnea, cough, choking, chest tightness, shortness of breath, wheezing and stridor.    Cardiovascular:  Negative for chest  "pain, palpitations and leg swelling.   Gastrointestinal:  Negative for abdominal distention, abdominal pain, anal bleeding, blood in stool, constipation, diarrhea, nausea, rectal pain and vomiting.   Endocrine: Negative for cold intolerance, heat intolerance, polydipsia, polyphagia and polyuria.   Genitourinary:  Negative for decreased urine volume, difficulty urinating, dysuria, enuresis, flank pain, frequency, genital sores, hematuria, penile discharge, penile pain, penile swelling, scrotal swelling, testicular pain and urgency.   Musculoskeletal:  Positive for back pain and gait problem. Negative for arthralgias, joint swelling, myalgias, neck pain and neck stiffness.   Skin:  Negative for color change, pallor, rash and wound.   Allergic/Immunologic: Negative for environmental allergies, food allergies and immunocompromised state.   Neurological:  Positive for weakness. Negative for dizziness, tremors, seizures, syncope, facial asymmetry, speech difficulty, light-headedness, numbness and headaches.   Hematological:  Negative for adenopathy. Does not bruise/bleed easily.   Psychiatric/Behavioral:  Negative for agitation, behavioral problems, confusion, decreased concentration, dysphoric mood, hallucinations, self-injury, sleep disturbance and suicidal ideas. The patient is not nervous/anxious and is not hyperactive.         The patient's past medical history, past surgical history, family history, and social history have been reviewed at length in the electronic medical record.    Physical examination:  Temperature 97 °F (36.1 °C), temperature source Infrared, height 180.3 cm (71\"), weight 91.3 kg (201 lb 3.2 oz).    Constitutional: The patient is well-developed, well-nourished. He is in no acute distress.     HEENT:  normocephalic, atraumatic, sclera clear    Extremities: positive pulses, no edema    Musculoskeletal:   Motor examination does not reveal weakness in the , upper or lower extremities.   Straight " leg testing is negative.  Wilber's signs are negative.  Tenderness to palpation in the right paravertebral muscles of the lumbar spine.       Neurologic Exam  A/A/C, speech clear, attention normal, conversant, answers questions appropriately, good historian.  Gait is normal, balance is normal.   No tremors are noted.  Reflexes are intact, perhaps slightly diminished in the lower extremity.     Medical Decision Making  Diagnoses and all orders for this visit:    1. Lumbar radiculopathy, right (Primary)  -     XR Spine Lumbar 2 or 3 View; Future  -     MRI Lumbar Spine Without Contrast; Future    2. S/P lumbar spinal fusion  -     MRI Lumbar Spine Without Contrast; Future    Other orders  -     cyclobenzaprine (FLEXERIL) 10 MG tablet; Take 1 tablet by mouth 3 (Three) Times a Day As Needed for Muscle Spasms.  Dispense: 30 tablet; Refill: 0    Mr. Brooks's right sided back pain largely seems to be mechanical.  He does harbor radiculopathy that is known, but by his report is getting worse.  I am ordering an MRI of the lumbar spine as well as plain films of the lumbar spine to assess his fusion and look for any changes.  The patient has chronic kidney disease and his recent GFR was fairly low.  As such, I am not going to start him on a regimen of gabapentin and I am avoiding a MRI with contrast presently.  I explained to him that the muscle relaxer can be taken as needed but to be cautious as it is also renally excreted.  The patient will follow-up in several weeks and recommendations we made at that time.    Any copied data from previous notes included in the (1) HPI, (2) PE, (3) MDM and/or assessment and plan has been reviewed and is accurate as of this day.    Lucy Chavira PA-C     Patient Care Team:  Juan Sosa MD as PCP - General (Family Medicine)  Lenin Sood MD as Surgeon (Neurosurgery)  Juan Sosa MD as Primary Care Provider (Family Medicine)  Xiang Peacock PA as Referring  Physician (Physician Assistant)

## 2024-10-09 ENCOUNTER — HOSPITAL ENCOUNTER (OUTPATIENT)
Dept: GENERAL RADIOLOGY | Facility: HOSPITAL | Age: 79
Discharge: HOME | End: 2024-10-09
Payer: MEDICARE

## 2024-10-09 ENCOUNTER — OFFICE VISIT (OUTPATIENT)
Dept: NEUROSURGERY | Facility: CLINIC | Age: 79
End: 2024-10-09
Payer: MEDICARE

## 2024-10-09 ENCOUNTER — HOSPITAL ENCOUNTER (OUTPATIENT)
Dept: MRI IMAGING | Facility: HOSPITAL | Age: 79
Discharge: HOME | End: 2024-10-09
Payer: MEDICARE

## 2024-10-09 VITALS — HEIGHT: 71 IN | WEIGHT: 198.2 LBS | BODY MASS INDEX: 27.75 KG/M2 | TEMPERATURE: 97.9 F

## 2024-10-09 DIAGNOSIS — M54.16 LUMBAR RADICULOPATHY, RIGHT: ICD-10-CM

## 2024-10-09 DIAGNOSIS — M54.16 LUMBAR RADICULOPATHY: ICD-10-CM

## 2024-10-09 DIAGNOSIS — M47.816 FACET ARTHROPATHY, LUMBAR: ICD-10-CM

## 2024-10-09 DIAGNOSIS — Z98.1 HISTORY OF LUMBAR FUSION: Primary | ICD-10-CM

## 2024-10-09 DIAGNOSIS — Z98.1 S/P LUMBAR SPINAL FUSION: ICD-10-CM

## 2024-10-09 DIAGNOSIS — M43.16 SPONDYLOLISTHESIS OF LUMBAR REGION: ICD-10-CM

## 2024-10-09 PROCEDURE — 72100 X-RAY EXAM L-S SPINE 2/3 VWS: CPT

## 2024-10-09 PROCEDURE — 72148 MRI LUMBAR SPINE W/O DYE: CPT

## 2024-10-09 NOTE — PROGRESS NOTES
"  Patient: Brandon Brooks  : 1945  Chart #: [id number]    Date of Service: 10/09/2024    Chief Complaint   Patient presents with    Back Pain    Leg Pain     RT leg down to RT foot.     Numbness     RT foot goes numb.     HPI: Mr. Brooks is an 79-year-old gentleman who retired from the [employer] business.  He has had back difficulties dating back to the 1960s.  He underwent surgery with Dr. Rodriguez in the s.  More recently, he presented with low back and right leg pain and was noted to have an L5-S1 spondylolisthesis with associated radiculopathy.  On 3/27/2023, he underwent an L5-S1 PLIF by Dr. Sood.  He has been slow to bounce back, and never really reported complete resolution of his right leg pain postoperatively.  He has been frustrated that he has not been able to resume some of the activities he likes to do for the last 2 years, such as golf.    Mr. Brooks presents with follow-up MRI studies.  He has had a lot of right sided back pain that is worse when he gets out of bed.  He also gets pain in his right leg with some numbness in his feet.  He takes to 500 mg Tylenol tablets in the morning and at night.  He denies saddle anesthesia, incontinence, or left leg symptoms.    Review of Systems   Musculoskeletal:  Positive for arthralgias and back pain.   Neurological:  Positive for numbness.        Physical examination:  Temperature 97.9 °F (36.6 °C), temperature source Temporal, height 180.3 cm (71\"), weight 89.9 kg (198 lb 3.2 oz).    Constitutional: The patient is well-developed, well-nourished.  He is in no acute distress.    HEENT: normocephalic, atraumatic, sclera clear    Musculoskeletal:  Motor examination does not reveal weakness in the , upper or lower extremities.   Straight leg testing is negative.  Wilber signs are negative.    Neurological Exam   A/A/C, speech clear, attention normal, conversant, answers questions appropriately, good historian.  Sensation is intact to light touch test.  Gait " is normal, balance is normal.   No tremors are noted.  Reflexes are intact.   Storey is negative. Clonus is negative.     Radiographic Imaging:  For my review plain films of the lumbar spine demonstrate intact fusion hardware spanning L5-S1.  I do not appreciate any perihardware lucency or malfunction.  There is a retrolisthesis of L4 on L5 and L3 on L4.    MRI of the lumbar spine dated 10/9/2024 demonstrates diffuse degenerative disc disease.  There are the above-noted listhesis.  There is moderate to severe bilateral neuroforaminal narrowing at the L3-4 disc space, more so on the right.  Joint effusions are noted.    Medical Decision Making  Diagnoses and all orders for this visit:    1. History of lumbar fusion (Primary)  -     XR Spine Lumbar Flex & Ext  -     Ambulatory Referral to Pain Management    2. Lumbar radiculopathy  -     XR Spine Lumbar Flex & Ext  -     Ambulatory Referral to Pain Management    3. Spondylolisthesis of lumbar region  -     XR Spine Lumbar Flex & Ext    4. Facet arthropathy, lumbar    Mr. Brooks may have a mild listhesis at an adjacent level above his fusion.  However, he has never reported resolution of his right leg symptoms since his prior fusion in 2023, and I presently do not recommend a surgical intervention.  For completeness, I have ordered flexion-extension x-rays of the lumbar spine to rule out a dynamic instability and we will contact him by phone if the findings are significant.  I have referred him to pain management for evaluation.  He will follow-up with our office on an as-needed basis.    Any copied data from previous notes included in the (1) HPI, (2) PE, (3) MDM and/or assessment and plan has been reviewed and is accurate as of this day.    Lucy Chavira PA-C     Patient Care Team:  Juan Sosa MD as PCP - General (Family Medicine)  Lenin Sood MD as Surgeon (Neurosurgery)  Juan Sosa MD as Primary Care Provider (Family Medicine)  Ayush  MARJORIE Encarnacion as Referring Physician (Physician Assistant)

## 2024-10-21 ENCOUNTER — OFFICE VISIT (OUTPATIENT)
Dept: PAIN MEDICINE | Facility: CLINIC | Age: 79
End: 2024-10-21
Payer: MEDICARE

## 2024-10-21 VITALS — HEIGHT: 71 IN | WEIGHT: 200 LBS | BODY MASS INDEX: 28 KG/M2

## 2024-10-21 DIAGNOSIS — M96.1 POSTLAMINECTOMY SYNDROME: Primary | ICD-10-CM

## 2024-10-21 PROCEDURE — 1159F MED LIST DOCD IN RCRD: CPT | Performed by: STUDENT IN AN ORGANIZED HEALTH CARE EDUCATION/TRAINING PROGRAM

## 2024-10-21 PROCEDURE — 1160F RVW MEDS BY RX/DR IN RCRD: CPT | Performed by: STUDENT IN AN ORGANIZED HEALTH CARE EDUCATION/TRAINING PROGRAM

## 2024-10-21 PROCEDURE — 1125F AMNT PAIN NOTED PAIN PRSNT: CPT | Performed by: STUDENT IN AN ORGANIZED HEALTH CARE EDUCATION/TRAINING PROGRAM

## 2024-10-21 PROCEDURE — 99204 OFFICE O/P NEW MOD 45 MIN: CPT | Performed by: STUDENT IN AN ORGANIZED HEALTH CARE EDUCATION/TRAINING PROGRAM

## 2024-10-21 PROCEDURE — G2211 COMPLEX E/M VISIT ADD ON: HCPCS | Performed by: STUDENT IN AN ORGANIZED HEALTH CARE EDUCATION/TRAINING PROGRAM

## 2024-10-21 RX ORDER — PREGABALIN 25 MG/1
25 CAPSULE ORAL 2 TIMES DAILY
Qty: 60 CAPSULE | Refills: 0 | Status: SHIPPED | OUTPATIENT
Start: 2024-10-21

## 2024-10-21 NOTE — PROGRESS NOTES
Referring Physician: Lucy Chavira PA-C  [facility]  [address] [place] [address]    Primary Physician: Juan Sosa MD    CHIEF COMPLAINT or REASON FOR VISIT: Back Pain (New patient)      Initial history of present illness on 10/21/2024:  Mr. Brandon Brooks is 79 y.o. male who presents as a new patient referral for evaluation treatment chronic low back pain with radiation right lower extremity and foot.  Patient reports a multiyear history of chronic back issues and right lower extremity numbness tingling and pain.  Pain is historically been L5 dermatome.  He underwent PLIF with Dr. Sood at L5/S1 in March 2023.  Unfortunately the surgery did not seem to resolve him symptoms.  He does state that it was somewhat helpful but he continues to complain of the same numbness tingling and pain down his right leg.  He was again evaluated by neurosurgery, Lucy Chavira PA-C, PA-C, who did not identify any role for surgical intervention and referred to pain management.  He has tried gabapentin with minimal benefit in the past.  He has tried epidural steroid injections with minimal benefit in the past.  Patient denies any bowel or bladder dysfunction, lower extremity weakness, new onset saddle anesthesia or unexplained weight loss.       Interval history:    Interventions:      Objective Pain Scoring:   BRIEF PAIN INVENTORY:  Total score:   Pain Score    10/21/24 1251   PainSc:   5   PainLoc: Hip      PHQ-2: 1  PHQ-9:    Opioid Risk Tool:         Review of Systems:   ROS negative except as otherwise noted     Past Medical History:   Past Medical History:   Diagnosis Date    Arthritis     Cancer     prostate cancer    GERD (gastroesophageal reflux disease)     Hearing loss     Hypertension     Irregular heart beat     Low back pain     PONV (postoperative nausea and vomiting)          Past Surgical History:   Past Surgical History:   Procedure Laterality Date    CATARACT EXTRACTION  Bilateral     HERNIA REPAIR      LUMBAR DISC SURGERY  1990    Dr cameron    LUMBAR LAMINECTOMY WITH FUSION N/A 3/27/2023    Procedure: LUMBAR FUSION DECOMPRESSON WITH PEDICLE SCREWS L5-S1;  Surgeon: Lenin Sood MD;  Location: [facility];  Service: Neurosurgery;  Laterality: N/A;    PROSTATECTOMY           Family History   Family History   Problem Relation Age of Onset    Cancer Mother     Heart disease Father     Cancer Brother          Social History   Social History     Socioeconomic History    Marital status:    Tobacco Use    Smoking status: Never    Smokeless tobacco: Never   Vaping Use    Vaping status: Never Used   Substance and Sexual Activity    Alcohol use: Never    Drug use: Never    Sexual activity: Defer        Medications:     Current Outpatient Medications:     acetaminophen (TYLENOL) 325 MG tablet, Take 2 tablets by mouth Every 6 (Six) Hours As Needed for Mild Pain. On rare occassion, Disp: , Rfl:     aspirin 81 MG chewable tablet, Chew 1 tablet Daily., Disp: , Rfl:     lisinopril-hydrochlorothiazide (PRINZIDE,ZESTORETIC) 20-12.5 MG per tablet, Take 1 tablet by mouth Daily., Disp: , Rfl:     cloNIDine (CATAPRES) 0.1 MG tablet, Take 1 tablet by mouth As Needed. Take one for systolic higher than 165 or diastole higher than 100 (Patient not taking: Reported on 10/21/2024), Disp: , Rfl:     cyclobenzaprine (FLEXERIL) 10 MG tablet, Take 1 tablet by mouth 3 (Three) Times a Day As Needed for Muscle Spasms. (Patient not taking: Reported on 10/21/2024), Disp: 30 tablet, Rfl: 0    ezetimibe (ZETIA) 10 MG tablet, Take 1 tablet by mouth Daily. (Patient not taking: Reported on 10/21/2024), Disp: , Rfl:     metoprolol succinate XL (TOPROL-XL) 25 MG 24 hr tablet, Take 1 tablet by mouth Daily., Disp: , Rfl:     PEG 3350-KCl-NaBcb-NaCl-NaSulf (PEG-3350/Electrolytes) 236 g reconstituted solution, , Disp: , Rfl:     pregabalin (Lyrica) 25 MG capsule, Take 1 capsule by mouth 2 (Two) Times a Day., Disp: 60  "capsule, Rfl: 0        Physical Exam:     Vitals:    10/21/24 1251   Weight: 90.7 kg (200 lb)   Height: 180.3 cm (71\")   PainSc:   5   PainLoc: Hip        General: Alert and oriented, No acute distress.   HEENT: Normocephalic, atraumatic.   Cardiovascular: No gross edema  Respiratory: Respirations are non-labored       Lumbar Spine:   No masses or atrophy  Range of motion - Flexion normal. Extension reduced.    Facet Loading: Positive bilaterally  Facet Palpation - Nontender   Ana Luisa finger/Gaenslen's/Wilber's/MARCO ANTONIO/Thigh thrust -   Straight leg raise/slump test: Negative bilaterally  Multifidus toe-touch test:    Motor Exam:       Strength: Rate on 1-5 scale Right Left    L1/2- hip flexion 5/5  5/5    L3- knee extension 5/5  5/5    L4- ankle dorsiflexion 5/5  5/5    L5- great toe extension 5/5  5/5    S1- ankle plantarflexion 5/5  5/5    Sensory Exam: Altered sensation right lower extremity       Neurologic: Cranial Nerves II-XII are grossly intact.      Psychiatric: Cooperative.   Gait: Normal   Assistive Devices: None    Imaging Studies:   Results for orders placed during the hospital encounter of 10/09/24    MRI Lumbar Spine Without Contrast    Narrative  MRI LUMBAR SPINE WO CONTRAST    Date of Exam: 10/9/2024 10:29 AM EDT    Indication: h/o fusion, new lumbar and sciatic pain.    Comparison: None available.    Technique:  Routine multiplanar/multisequence sequence images of the lumbar spine were obtained without contrast administration.      Findings:  There are normal vertebral body heights. There is straightening of curvature.    Conus terminates at L1 and appears grossly normal. There is a small subligamentous disc protrusion at T12-L1 without significant spinal stenosis. There is slight disc bulge at L1-2 without spinal stenosis or neural foraminal narrowing.    There is mild to moderate narrowing of the L2-3 disc and there is slight retrolisthesis of L2 on L3. There is facet joint hypertrophy. There " is a moderate degree of spinal stenosis. There is mild bilateral neural foraminal narrowing without focal nerve  root impingement.    There is mild retrolisthesis of L3 on L4. Severe disc narrowing. There is facet joint hypertrophy. There is moderately severe spinal stenosis. There is moderately severe neural foraminal stenosis with impingement of portions of exiting L3 nerve roots  bilaterally.    There is moderately severe narrowing of the L4-5 disc. There is facet joint hypertrophy. There is moderate narrowing of the canal in the sagittal plane. There is moderate bilateral neural foraminal stenosis with some impingement of exiting L4 nerve roots  bilaterally.    There is artifact from L5 and S1 pedicle screws. There are posterior laminectomy defects at this level. There is no spinal stenosis at L5-S1. There is normal alignment at the operative site. Artifact obscures detail of the neural foramina.    Impression  Impression:  Moderate spinal stenosis L2-3 and L4-5. Moderately severe spinal stenosis L3-4. Bilateral neural foraminal impingement L3-4 and L4-5. Postsurgical changes L5-S1.      Electronically Signed: Hoa Wright MD  10/10/2024 12:36 PM EDT  Workstation ID: [id number]        Independent review of radiographic imaging: Available for my interpretation is a lumbar MRI dated October 9, 2024 demonstrating L5/S1 PLIF; L2/L3 moderate canal stenosis; moderate right L3/L4 neuroforaminal stenosis; endplate edema at L2/L3 and L3/L4.    Impression & Plan:       10/21/2024: Brandon Brooks is a 79 y.o. male with past medical history significant for arthritis, CKD, diabetes, HLD, HTN, laminectomy with L5/S1 fusion with Dr. Sood in March 2023 who presents to the pain clinic for evaluation and treatment of persistent chronic low back pain with radiation right lower extremity.  MRI interpretation as above.  Evaluation consistent with postlaminectomy syndrome.  We discussed spinal cord stimulator trial.  I had a  discussion with the patient regarding the risks of the procedure including bleeding, infection, damage to surrounding structures, infection, migration.  Additionally we will trial pregabalin.    1. Postlaminectomy syndrome        PLAN:  1. Medication Recommendations: Recommend Voltaren topical, NSAIDs, Tylenol.  Can trial turmeric 500 mg twice daily if NSAID contraindicated.  -Start pregabalin 25 mg twice a day 60 pills no refills  -As part of this patient's treatment plan, patient may be prescribed controlled substances. The patient has been made aware of appropriate use of such medications, including potential risk of somnolence, limited ability to drive and /or work safely, and potential for dependence or overdose. It has also been made clear that these medications are for use by this patient only, without concomitant use of alcohol or other substances unless prescribed. Controlled substance status of medication discussed with patient, discussed risks of medication including abuse potential and diversion potential and need to follow up for reevaluation appointment in order to receive further refills.  He was reviewed and compliant.    2. Physical Therapy: Continue HEP    3. Psychological: Obtain [facility] screen for SCS trial    4. Complementary and alternative (CAM) Therapies:     5. Labs/Diagnostic studies: None indicated     6. Imaging: MRI independently interpreted and reviewed with patient    7. Interventions: Will plan for spinal cord Jordan trial with [employer] if appropriate per psych (63650 x 2).    8. Referrals: None indicated     9. Records: Neurosurgery notes reviewed.    10. Lifestyle goals:    Follow-up 1 month      [facility]  Josue Meadows MD          Quality Metrics:

## 2024-10-22 DIAGNOSIS — Z00.8 PRE-SURGICAL PSYCHOLOGICAL ASSESSMENT, ENCOUNTER FOR: Primary | ICD-10-CM

## 2024-11-19 ENCOUNTER — LAB (OUTPATIENT)
Dept: LAB | Facility: HOSPITAL | Age: 79
End: 2024-11-19
Payer: MEDICARE

## 2024-11-19 ENCOUNTER — OFFICE VISIT (OUTPATIENT)
Dept: PAIN MEDICINE | Facility: CLINIC | Age: 79
End: 2024-11-19
Payer: MEDICARE

## 2024-11-19 VITALS — WEIGHT: 202 LBS | BODY MASS INDEX: 28.28 KG/M2 | HEIGHT: 71 IN

## 2024-11-19 DIAGNOSIS — Z51.81 THERAPEUTIC DRUG MONITORING: Primary | ICD-10-CM

## 2024-11-19 DIAGNOSIS — M96.1 POSTLAMINECTOMY SYNDROME: Primary | ICD-10-CM

## 2024-11-19 DIAGNOSIS — Z51.81 THERAPEUTIC DRUG MONITORING: ICD-10-CM

## 2024-11-19 LAB
AMPHET+METHAMPHET UR QL: NEGATIVE
AMPHETAMINES UR QL: NEGATIVE
BARBITURATES UR QL SCN: NEGATIVE
BENZODIAZ UR QL SCN: NEGATIVE
BUPRENORPHINE SERPL-MCNC: NEGATIVE NG/ML
CANNABINOIDS SERPL QL: NEGATIVE
COCAINE UR QL: NEGATIVE
FENTANYL UR-MCNC: NEGATIVE NG/ML
METHADONE UR QL SCN: NEGATIVE
OPIATES UR QL: NEGATIVE
OXYCODONE UR QL SCN: NEGATIVE
PCP UR QL SCN: NEGATIVE
TRICYCLICS UR QL SCN: NEGATIVE

## 2024-11-19 PROCEDURE — 99214 OFFICE O/P EST MOD 30 MIN: CPT

## 2024-11-19 PROCEDURE — 1159F MED LIST DOCD IN RCRD: CPT

## 2024-11-19 PROCEDURE — 1125F AMNT PAIN NOTED PAIN PRSNT: CPT

## 2024-11-19 PROCEDURE — G2211 COMPLEX E/M VISIT ADD ON: HCPCS

## 2024-11-19 PROCEDURE — 80307 DRUG TEST PRSMV CHEM ANLYZR: CPT

## 2024-11-19 PROCEDURE — 1160F RVW MEDS BY RX/DR IN RCRD: CPT

## 2024-11-19 RX ORDER — PREGABALIN 50 MG/1
50 CAPSULE ORAL 2 TIMES DAILY
Qty: 60 CAPSULE | Refills: 1 | Status: SHIPPED | OUTPATIENT
Start: 2024-11-19

## 2024-11-19 NOTE — TELEPHONE ENCOUNTER
Increase pregabalin  Pregabalin 50 mg twice daily, 60 pills, 1 refill  He reviewed and compliant  Compliance UDS pending  Controlled substance agreement signed in office

## 2024-11-19 NOTE — PROGRESS NOTES
Referring Physician: No referring provider defined for this encounter.    Primary Physician: Juan Sosa MD    CHIEF COMPLAINT or REASON FOR VISIT: Follow-up (Discuss SCS), Back Pain, and Med Management      Initial history of present illness on 10/21/2024:  Mr. Brandon Brooks is 79 y.o. male who presents as a new patient referral for evaluation treatment chronic low back pain with radiation right lower extremity and foot.  Patient reports a multiyear history of chronic back issues and right lower extremity numbness tingling and pain.  Pain is historically been L5 dermatome.  He underwent PLIF with Dr. Sood at L5/S1 in March 2023.  Unfortunately the surgery did not seem to resolve him symptoms.  He does state that it was somewhat helpful but he continues to complain of the same numbness tingling and pain down his right leg.  He was again evaluated by neurosurgery, Lucy Chavira PA-C, PA-C, who did not identify any role for surgical intervention and referred to pain management.  He has tried gabapentin with minimal benefit in the past.  He has tried epidural steroid injections with minimal benefit in the past.  Patient denies any bowel or bladder dysfunction, lower extremity weakness, new onset saddle anesthesia or unexplained weight loss.       Interval history:  Patient returns to clinic today.  He continues to complain of chronic low back pain with radiation to right lower extremity and foot.  He was started on pregabalin 25 mg twice daily at his last office visit.  He has been taking this medication with some relief.  He is tolerating medication without side effect.  He was referred to [facility] for psychiatric clearance at his last office visit.  He reports he has been evaluated by them.  We have not received appropriate psychiatric clearance as of this moment.  He remains interested in spinal cord stimulator trial.  No new injuries or events since his previous  appointment.    Interventions:      Objective Pain Scoring:   BRIEF PAIN INVENTORY:  Total score:   Pain Score    11/19/24 0826   PainSc:   3   PainLoc: Back      PHQ-2: 0  PHQ-9:    Opioid Risk Tool:         Review of Systems:   ROS negative except as otherwise noted     Past Medical History:   Past Medical History:   Diagnosis Date    Arthritis     Cancer     prostate cancer    GERD (gastroesophageal reflux disease)     Hearing loss     Hypertension     Irregular heart beat     Low back pain     PONV (postoperative nausea and vomiting)          Past Surgical History:   Past Surgical History:   Procedure Laterality Date    CATARACT EXTRACTION Bilateral     HERNIA REPAIR      LUMBAR DISC SURGERY  1990    Dr cameron    LUMBAR LAMINECTOMY WITH FUSION N/A 3/27/2023    Procedure: LUMBAR FUSION DECOMPRESSON WITH PEDICLE SCREWS L5-S1;  Surgeon: Lenin Sood MD;  Location: [facility];  Service: Neurosurgery;  Laterality: N/A;    PROSTATECTOMY           Family History   Family History   Problem Relation Age of Onset    Cancer Mother     Heart disease Father     Cancer Brother          Social History   Social History     Socioeconomic History    Marital status:    Tobacco Use    Smoking status: Never    Smokeless tobacco: Never   Vaping Use    Vaping status: Never Used   Substance and Sexual Activity    Alcohol use: Never    Drug use: Never    Sexual activity: Defer        Medications:     Current Outpatient Medications:     acetaminophen (TYLENOL) 325 MG tablet, Take 2 tablets by mouth Every 6 (Six) Hours As Needed for Mild Pain. On rare occassion, Disp: , Rfl:     aspirin 81 MG chewable tablet, Chew 1 tablet Daily., Disp: , Rfl:     cyclobenzaprine (FLEXERIL) 10 MG tablet, Take 1 tablet by mouth 3 (Three) Times a Day As Needed for Muscle Spasms., Disp: 30 tablet, Rfl: 0    lisinopril-hydrochlorothiazide (PRINZIDE,ZESTORETIC) 20-12.5 MG per tablet, Take 1 tablet by mouth Daily., Disp: , Rfl:     pregabalin (Lyrica)  "25 MG capsule, Take 1 capsule by mouth 2 (Two) Times a Day., Disp: 60 capsule, Rfl: 0    cloNIDine (CATAPRES) 0.1 MG tablet, Take 1 tablet by mouth As Needed. Take one for systolic higher than 165 or diastole higher than 100 (Patient not taking: Reported on 10/21/2024), Disp: , Rfl:     ezetimibe (ZETIA) 10 MG tablet, Take 1 tablet by mouth Daily. (Patient not taking: Reported on 10/21/2024), Disp: , Rfl:     metoprolol succinate XL (TOPROL-XL) 25 MG 24 hr tablet, Take 1 tablet by mouth Daily., Disp: , Rfl:     PEG 3350-KCl-NaBcb-NaCl-NaSulf (PEG-3350/Electrolytes) 236 g reconstituted solution, , Disp: , Rfl:         Physical Exam:     Vitals:    11/19/24 0826   Weight: 91.6 kg (202 lb)   Height: 180.3 cm (70.98\")   PainSc:   3   PainLoc: Back        General: Alert and oriented, No acute distress.   HEENT: Normocephalic, atraumatic.   Cardiovascular: No gross edema  Respiratory: Respirations are non-labored       Lumbar Spine:   No masses or atrophy  Range of motion - Flexion normal. Extension reduced.    Facet Loading: Positive bilaterally  Facet Palpation - Nontender   Ana Luisa finger/Gaenslen's/Wilber's/MARCO ANTONIO/Thigh thrust -   Straight leg raise/slump test: Negative bilaterally  Multifidus toe-touch test:    Motor Exam:       Strength: Rate on 1-5 scale Right Left    L1/2- hip flexion 5/5  5/5    L3- knee extension 5/5  5/5    L4- ankle dorsiflexion 5/5  5/5    L5- great toe extension 5/5  5/5    S1- ankle plantarflexion 5/5  5/5    Sensory Exam: Altered sensation right lower extremity       Neurologic: Cranial Nerves II-XII are grossly intact.      Psychiatric: Cooperative.   Gait: Normal   Assistive Devices: None    Imaging Studies:   Results for orders placed during the hospital encounter of 10/09/24    MRI Lumbar Spine Without Contrast    Narrative  MRI LUMBAR SPINE WO CONTRAST    Date of Exam: 10/9/2024 10:29 AM EDT    Indication: h/o fusion, new lumbar and sciatic pain.    Comparison: None " available.    Technique:  Routine multiplanar/multisequence sequence images of the lumbar spine were obtained without contrast administration.      Findings:  There are normal vertebral body heights. There is straightening of curvature.    Conus terminates at L1 and appears grossly normal. There is a small subligamentous disc protrusion at T12-L1 without significant spinal stenosis. There is slight disc bulge at L1-2 without spinal stenosis or neural foraminal narrowing.    There is mild to moderate narrowing of the L2-3 disc and there is slight retrolisthesis of L2 on L3. There is facet joint hypertrophy. There is a moderate degree of spinal stenosis. There is mild bilateral neural foraminal narrowing without focal nerve  root impingement.    There is mild retrolisthesis of L3 on L4. Severe disc narrowing. There is facet joint hypertrophy. There is moderately severe spinal stenosis. There is moderately severe neural foraminal stenosis with impingement of portions of exiting L3 nerve roots  bilaterally.    There is moderately severe narrowing of the L4-5 disc. There is facet joint hypertrophy. There is moderate narrowing of the canal in the sagittal plane. There is moderate bilateral neural foraminal stenosis with some impingement of exiting L4 nerve roots  bilaterally.    There is artifact from L5 and S1 pedicle screws. There are posterior laminectomy defects at this level. There is no spinal stenosis at L5-S1. There is normal alignment at the operative site. Artifact obscures detail of the neural foramina.    Impression  Impression:  Moderate spinal stenosis L2-3 and L4-5. Moderately severe spinal stenosis L3-4. Bilateral neural foraminal impingement L3-4 and L4-5. Postsurgical changes L5-S1.      Electronically Signed: Hoa Wright MD  10/10/2024 12:36 PM EDT  Workstation ID: [id number]        Independent review of radiographic imaging: Available for my interpretation is a lumbar MRI dated October 9, 2024  demonstrating L5/S1 PLIF; L2/L3 moderate canal stenosis; moderate right L3/L4 neuroforaminal stenosis; endplate edema at L2/L3 and L3/L4.    Impression & Plan:       10/21/2024: Brandon Brooks is a 79 y.o. male with past medical history significant for arthritis, CKD, diabetes, HLD, HTN, laminectomy with L5/S1 fusion with Dr. Sood in March 2023 who presents to the pain clinic for evaluation and treatment of persistent chronic low back pain with radiation right lower extremity.  MRI interpretation as above.  Evaluation consistent with postlaminectomy syndrome.  We discussed spinal cord stimulator trial.  I had a discussion with the patient regarding the risks of the procedure including bleeding, infection, damage to surrounding structures, infection, migration.  Additionally we will trial pregabalin.  11/19/2024: Will obtain appropriate psychiatric clearance.  Increase pregabalin.  Will plan for spinal cord stimulator trial with [employer]    1. Postlaminectomy syndrome          PLAN:  1. Medication Recommendations: Recommend Voltaren topical, NSAIDs, Tylenol.  Can trial turmeric 500 mg twice daily if NSAID contraindicated.  -Increase pregabalin  pregabalin 50 mg twice a day 60 pills #1 refills  -As part of this patient's treatment plan, patient may be prescribed controlled substances. The patient has been made aware of appropriate use of such medications, including potential risk of somnolence, limited ability to drive and /or work safely, and potential for dependence or overdose. It has also been made clear that these medications are for use by this patient only, without concomitant use of alcohol or other substances unless prescribed. Controlled substance status of medication discussed with patient, discussed risks of medication including abuse potential and diversion potential and need to follow up for reevaluation appointment in order to receive further refills.  He was reviewed and compliant.    2. Physical  Therapy: Continue HEP    3. Psychological: Obtain [facility] screen for SCS trial; patient states he has been evaluated by [facility].  We will reach out to them to have appropriate psychiatric clearance faxed over.    4. Complementary and alternative (CAM) Therapies:     5. Labs/Diagnostic studies: Obtain compliance UDS    6. Imagin. Interventions: Will plan for spinal cord Jordan trial with [employer] if appropriate per psych (63650 x 2). Risks of this procedure were discussed in detail.  They include bleeding, infection, damage to surrounding structures which could exacerbate symptoms paralysis, death, lead migration, malfunction of device, pocket site pain.   Patient was advised he can hold his aspirin 7 days before spinal cord stimulator trial.  Hold throughout the length of trial.  Safe to resume 24 hours after lead pull.  -Once we receive appropriate psychiatric clearance we will schedule spinal cord stimulator trial.  [employer] device rep here for education.    8. Referrals: None indicated     9. Records:     10. Lifestyle goals:    Follow-up 1 month; spinal cord stimulator trial is scheduled we will schedule appropriate follow-up 4 days after procedure for lead pull.      [facility]  Marcio Hinds PA-C          Quality Metrics:

## 2024-11-26 ENCOUNTER — TELEPHONE (OUTPATIENT)
Dept: PAIN MEDICINE | Facility: CLINIC | Age: 79
End: 2024-11-26
Payer: MEDICARE

## 2024-11-26 DIAGNOSIS — M96.1 POSTLAMINECTOMY SYNDROME: Primary | ICD-10-CM

## 2024-11-26 NOTE — TELEPHONE ENCOUNTER
----- Message from Josue Meadows sent at 11/23/2024  1:31 PM EST -----  Ok to schedule scs trial with [facility]  ----- Message -----  From: Azalia Kelsey MA  Sent: 11/22/2024  11:30 AM EST  To: Josue Meadows MD; Marcio Hinds PA-C    I SEE NO SIGNIFICANT PSYCHOLOGICAL FACTORS THAT WOULD HINDER THE SUCCESS OF A SPINAL CORD STIMULATOR. I AFFIRM THAT ZAY GUADALUPE IS A GOOD CANDIDATE FOR THE PROCEDURE.

## 2024-12-17 ENCOUNTER — TELEPHONE (OUTPATIENT)
Dept: PAIN MEDICINE | Facility: CLINIC | Age: 79
End: 2024-12-17
Payer: MEDICARE

## 2024-12-17 NOTE — TELEPHONE ENCOUNTER
Caller: ZAY GUADALUPE    Phone Number: [phone] (home)     Reason for Call:   PATIENT CALLING IN TO FIND OUT WHERE AND WHAT TIME FOR HIS PROCEDURE ON THURSDAY 12-19-24

## 2024-12-19 ENCOUNTER — OUTSIDE FACILITY SERVICE (OUTPATIENT)
Dept: PAIN MEDICINE | Facility: CLINIC | Age: 79
End: 2024-12-19
Payer: MEDICARE

## 2024-12-19 ENCOUNTER — DOCUMENTATION (OUTPATIENT)
Dept: PAIN MEDICINE | Facility: CLINIC | Age: 79
End: 2024-12-19

## 2024-12-19 NOTE — PROGRESS NOTES
[facility]  [address] [place] [address]    PROCEDURE: Spinal Cord Stimulator Trial, Two Lead(s)  Device: ABBOTT    PRE-OP DIAGNOSIS: postlaminectomy syndrome  POST-OP DIAGNOSIS:Same    CONSENT: Risks, benefits and options were explained to the patient, all questions were answered and written informed consent was obtained.     BLOOD THINNERS (ANTIPLATELETS/ANTICOAGULANTS): Were discussed with the patient and HERRERA Guidelines were followed.    ANTIBIOTICS: Ancef 2 grams IV    ANESTHESIA: Moderate sedation was required to maintain comfort, safety, and cooperation during the procedure.  The duration of sedation service was over 10 minutes.  Patient received 2mg IV Versed and 50mcg IV fentanyl.  Independent observation and monitoring was performed by Jeanette Mitchell.  The patient's level of consciousness and physiologic status was continually monitored with pulse oximetry, EKG from 1059 to 1122.  There were no complications or adverse events during sedation.  After the sedation patient was taken to the recovery area.      PROCEDURE NOTE: The patient was placed prone with the abdomen supported by a pillow and all pressure points were padded. Standard ASA monitors were applied. A timeout protocol was performed. Intravenous Antibiotics were administered prior to needle placement. Proper protective gear was worn by the physician including a mask, scrub cap, sterile gown, and sterile gloves. The patient was prepped and draped in the usual sterile fashion using Chlorhexidine, followed by sterile towels and laparotomy full body drape. Fluoroscopy was then used to identify entry into the epidural space and needle entry site at the skin. Next, a 50-50 mixture containing 0.5% bupivacaine and 2% lidocaine plain was used to raise a skin wheal and anesthetize deeper tissues. Next, a 22 gauge 3.5 inch spinal needle was used to anesthetize the tract down to the target lamina with the same  mixture. Using intermittent fluoroscopic guidance, a 14 gauge Tuohy needle was then advanced to contact the inferior lamina of the target entry interlaminar space. It was then walked off in a superior medial direction and a loss of resistance technique with stimulator lead was used to facilitate entrance into the epidural space of the T12/L1 interlaminar space. A stimulator lead was advanced through the Tuohy needle into the epidural space and directed to rest the tip at the top of the T7 vertebral body.  A second stimulator lead was inserted on the contralateral side using the identical technique and advanced to the top of the T8 vertebral body.  AP and lateral views were used to confirm appropriate posterior and midline position of the leads. [Once leads were determined to be in the correct position, the needles were withdrawn from the epidural space leaving the leads carefully in place. Leads were then secured with Steri-Strips and Tegaderm. The patient as then transferred to a stretcher and taken to recovery in stable condition.    EBL: Minimal    COMPLICATIONS: None    The patient tolerated the procedure well. Vital signs were stable. Sensory and motor exam was unchanged from baseline. The patient was observed in recovery for appropriate time and discharged in stable condition.    FOLLOW UP: as scheduled for post operative visit in clinic    ADDITIONAL NOTES: None    Final programming of the device was done in the recovery room by the device  representative. The patient (or responsible party) was given post-procedural and  discharge instructions to follow at home with an emphasis on instructions to avoid  infection and lead migration.    CODES:  63650 x2  72315  [id number]

## 2024-12-23 ENCOUNTER — OFFICE VISIT (OUTPATIENT)
Dept: PAIN MEDICINE | Facility: CLINIC | Age: 79
End: 2024-12-23
Payer: MEDICARE

## 2024-12-23 VITALS — BODY MASS INDEX: 28.28 KG/M2 | HEIGHT: 71 IN | WEIGHT: 202 LBS

## 2024-12-23 DIAGNOSIS — M96.1 POSTLAMINECTOMY SYNDROME: Primary | ICD-10-CM

## 2024-12-23 PROCEDURE — G2211 COMPLEX E/M VISIT ADD ON: HCPCS

## 2024-12-23 PROCEDURE — 1160F RVW MEDS BY RX/DR IN RCRD: CPT

## 2024-12-23 PROCEDURE — 1125F AMNT PAIN NOTED PAIN PRSNT: CPT

## 2024-12-23 PROCEDURE — 99024 POSTOP FOLLOW-UP VISIT: CPT

## 2024-12-23 PROCEDURE — 1159F MED LIST DOCD IN RCRD: CPT

## 2024-12-23 NOTE — PROGRESS NOTES
"  Referring Physician: No referring provider defined for this encounter.    Primary Physician: Juan Sosa MD    CHIEF COMPLAINT or REASON FOR VISIT: Follow-up (Discuss SCS trial) and Back Pain      Initial history of present illness on 10/21/2024:  Mr. Brandon Brooks is 79 y.o. male who presents as a new patient referral for evaluation treatment chronic low back pain with radiation right lower extremity and foot.  Patient reports a multiyear history of chronic back issues and right lower extremity numbness tingling and pain.  Pain is historically been L5 dermatome.  He underwent PLIF with Dr. Sood at L5/S1 in March 2023.  Unfortunately the surgery did not seem to resolve him symptoms.  He does state that it was somewhat helpful but he continues to complain of the same numbness tingling and pain down his right leg.  He was again evaluated by neurosurgery, Lucy Chavira PA-C, PA-C, who did not identify any role for surgical intervention and referred to pain management.  He has tried gabapentin with minimal benefit in the past.  He has tried epidural steroid injections with minimal benefit in the past.  Patient denies any bowel or bladder dysfunction, lower extremity weakness, new onset saddle anesthesia or unexplained weight loss.       Interval history:  Patient returns to clinic today for spinal cord stimulator trial lead pull.  He underwent a spinal cord stimulator trial with Abbott on 12/19/2024.  Patient reports good pain relief from this procedure.  He has noticed an improvement in his back and right radicular leg pain.  Additionally, he has felt as if he is able to walk further distances.  Before this procedure he felt as if his right foot was \"dragging\".  He reports this symptom was improved after the spinal cord stimulator trial.  He is happy with the relief he received from this trial and is interested in a permanent implant.  He tolerated the procedure well.       Interventions:  12/19/2024: " SCS trial Abbott with 50% relief    Objective Pain Scoring:   BRIEF PAIN INVENTORY:  Total score:   Pain Score    12/23/24 0927   PainSc:   4   PainLoc: Back        PHQ-2: 0  PHQ-9:    Opioid Risk Tool:         Review of Systems:   ROS negative except as otherwise noted     Past Medical History:   Past Medical History:   Diagnosis Date    Arthritis     Cancer     prostate cancer    GERD (gastroesophageal reflux disease)     Hearing loss     Hypertension     Irregular heart beat     Low back pain     PONV (postoperative nausea and vomiting)          Past Surgical History:   Past Surgical History:   Procedure Laterality Date    CATARACT EXTRACTION Bilateral     HERNIA REPAIR      LUMBAR DISC SURGERY  1990    Dr cameron    LUMBAR LAMINECTOMY WITH FUSION N/A 3/27/2023    Procedure: LUMBAR FUSION DECOMPRESSON WITH PEDICLE SCREWS L5-S1;  Surgeon: Lenin Sood MD;  Location: [facility];  Service: Neurosurgery;  Laterality: N/A;    PROSTATECTOMY           Family History   Family History   Problem Relation Age of Onset    Cancer Mother     Heart disease Father     Cancer Brother          Social History   Social History     Socioeconomic History    Marital status:    Tobacco Use    Smoking status: Never    Smokeless tobacco: Never   Vaping Use    Vaping status: Never Used   Substance and Sexual Activity    Alcohol use: Never    Drug use: Never    Sexual activity: Defer        Medications:     Current Outpatient Medications:     acetaminophen (TYLENOL) 325 MG tablet, Take 2 tablets by mouth Every 6 (Six) Hours As Needed for Mild Pain. On rare occassion, Disp: , Rfl:     aspirin 81 MG chewable tablet, Chew 1 tablet Daily., Disp: , Rfl:     cyclobenzaprine (FLEXERIL) 10 MG tablet, Take 1 tablet by mouth 3 (Three) Times a Day As Needed for Muscle Spasms., Disp: 30 tablet, Rfl: 0    lisinopril-hydrochlorothiazide (PRINZIDE,ZESTORETIC) 20-12.5 MG per tablet, Take 1 tablet by mouth Daily., Disp: , Rfl:     pregabalin  "(LYRICA) 50 MG capsule, Take 1 capsule by mouth 2 (Two) Times a Day., Disp: 60 capsule, Rfl: 1    cloNIDine (CATAPRES) 0.1 MG tablet, Take 1 tablet by mouth As Needed. Take one for systolic higher than 165 or diastole higher than 100 (Patient not taking: Reported on 10/21/2024), Disp: , Rfl:     ezetimibe (ZETIA) 10 MG tablet, Take 1 tablet by mouth Daily. (Patient not taking: Reported on 10/21/2024), Disp: , Rfl:     metoprolol succinate XL (TOPROL-XL) 25 MG 24 hr tablet, Take 1 tablet by mouth Daily., Disp: , Rfl:     PEG 3350-KCl-NaBcb-NaCl-NaSulf (PEG-3350/Electrolytes) 236 g reconstituted solution, , Disp: , Rfl:         Physical Exam:     Vitals:    12/23/24 0927   Weight: 91.6 kg (202 lb)   Height: 180.3 cm (70.98\")   PainSc:   4   PainLoc: Back          General: Alert and oriented, No acute distress.   HEENT: Normocephalic, atraumatic.   Cardiovascular: No gross edema  Respiratory: Respirations are non-labored       Lumbar Spine:   No masses or atrophy  Range of motion - Flexion normal. Extension reduced.    Facet Loading: Positive bilaterally  Facet Palpation - Nontender   Ana Luisa finger/Gaenslen's/Wilber's/MARCO ANTONIO/Thigh thrust -   Straight leg raise/slump test: Negative bilaterally  Multifidus toe-touch test:    Motor Exam:       Strength: Rate on 1-5 scale Right Left    L1/2- hip flexion 5/5  5/5    L3- knee extension 5/5  5/5    L4- ankle dorsiflexion 5/5  5/5    L5- great toe extension 5/5  5/5    S1- ankle plantarflexion 5/5  5/5    Sensory Exam: Altered sensation right lower extremity       Neurologic: Cranial Nerves II-XII are grossly intact.      Psychiatric: Cooperative.   Gait: Normal   Assistive Devices: None      2 spinal cord stimulator leads were removed without incident with tip intact.  No signs of surrounding erythema, inflammation, or purulent drainage.  Lead entry sites were thoroughly cleaned and an adhesive bandage was placed over entry site.     Imaging Studies:   Results for orders " placed during the hospital encounter of 10/09/24    MRI Lumbar Spine Without Contrast    Narrative  MRI LUMBAR SPINE WO CONTRAST    Date of Exam: 10/9/2024 10:29 AM EDT    Indication: h/o fusion, new lumbar and sciatic pain.    Comparison: None available.    Technique:  Routine multiplanar/multisequence sequence images of the lumbar spine were obtained without contrast administration.      Findings:  There are normal vertebral body heights. There is straightening of curvature.    Conus terminates at L1 and appears grossly normal. There is a small subligamentous disc protrusion at T12-L1 without significant spinal stenosis. There is slight disc bulge at L1-2 without spinal stenosis or neural foraminal narrowing.    There is mild to moderate narrowing of the L2-3 disc and there is slight retrolisthesis of L2 on L3. There is facet joint hypertrophy. There is a moderate degree of spinal stenosis. There is mild bilateral neural foraminal narrowing without focal nerve  root impingement.    There is mild retrolisthesis of L3 on L4. Severe disc narrowing. There is facet joint hypertrophy. There is moderately severe spinal stenosis. There is moderately severe neural foraminal stenosis with impingement of portions of exiting L3 nerve roots  bilaterally.    There is moderately severe narrowing of the L4-5 disc. There is facet joint hypertrophy. There is moderate narrowing of the canal in the sagittal plane. There is moderate bilateral neural foraminal stenosis with some impingement of exiting L4 nerve roots  bilaterally.    There is artifact from L5 and S1 pedicle screws. There are posterior laminectomy defects at this level. There is no spinal stenosis at L5-S1. There is normal alignment at the operative site. Artifact obscures detail of the neural foramina.    Impression  Impression:  Moderate spinal stenosis L2-3 and L4-5. Moderately severe spinal stenosis L3-4. Bilateral neural foraminal impingement L3-4 and L4-5.  Postsurgical changes L5-S1.      Electronically Signed: Hoa Wright MD  10/10/2024 12:36 PM EDT  Workstation ID: [id number]        Independent review of radiographic imaging: Available for my interpretation is a lumbar MRI dated October 9, 2024 demonstrating L5/S1 PLIF; L2/L3 moderate canal stenosis; moderate right L3/L4 neuroforaminal stenosis; endplate edema at L2/L3 and L3/L4.    Impression & Plan:       10/21/2024: Brandon Brooks is a 79 y.o. male with past medical history significant for arthritis, CKD, diabetes, HLD, HTN, laminectomy with L5/S1 fusion with Dr. Sood in March 2023 who presents to the pain clinic for evaluation and treatment of persistent chronic low back pain with radiation right lower extremity.  MRI interpretation as above.  Evaluation consistent with postlaminectomy syndrome.  We discussed spinal cord stimulator trial.  I had a discussion with the patient regarding the risks of the procedure including bleeding, infection, damage to surrounding structures, infection, migration.  Additionally we will trial pregabalin.  11/19/2024: Will obtain appropriate psychiatric clearance.  Increase pregabalin.  Will plan for spinal cord stimulator trial with Abbott  12/23/2024: Good relief from SCS trial with Abbott.  Will plan for permanent implant.    1. Postlaminectomy syndrome            PLAN:  1. Medication Recommendations: Recommend Voltaren topical, NSAIDs, Tylenol.  Can trial turmeric 500 mg twice daily if NSAID contraindicated.  -Chlorhexidine wash to be used 5 days prior to procedure  -Mupirocin nasal ointment to be used 3 times a day for 5 days prior to procedure  -Continue pregabalin  pregabalin 50 mg twice a day 60 pills #1 refills  -As part of this patient's treatment plan, patient may be prescribed controlled substances. The patient has been made aware of appropriate use of such medications, including potential risk of somnolence, limited ability to drive and /or work safely, and potential  for dependence or overdose. It has also been made clear that these medications are for use by this patient only, without concomitant use of alcohol or other substances unless prescribed. Controlled substance status of medication discussed with patient, discussed risks of medication including abuse potential and diversion potential and need to follow up for reevaluation appointment in order to receive further refills.  He was reviewed and compliant.    2. Physical Therapy: Continue HEP    3. Psychological: Psych clearance obtained    4. Complementary and alternative (CAM) Therapies:     5. Labs/Diagnostic studies: Compliant UDS as of 2024    6. Imagin. Interventions: Schedule permanent spinal cord stimulator later implant with Abbott (63650 x2, 79207, 17691). Risks of this procedure were discussed in detail.  They include bleeding, infection, damage to surrounding structures which could exacerbate symptoms paralysis, death, lead migration, malfunction of device, pocket site pain.   -Patient advised he may hold his aspirin 7 days prior to procedure.  Safe to resume 24 hours after procedure.  -Appropriate postoperative restrictions were discussed with patient.  He should avoid bending, twisting, reaching, lifting for approximately 6 weeks after implant  -Appropriate wound care instructions discussed with patient in detail  -Spinal cord stimulator trial leads removed today without incident with tip intact.    8. Referrals: None indicated     9. Records:     10. Lifestyle goals:    Follow-up 10-14 days after procedure      [facility]  Marcio Hinds PA-C          Quality Metrics:

## 2024-12-24 DIAGNOSIS — M96.1 POSTLAMINECTOMY SYNDROME: Primary | ICD-10-CM

## 2025-01-13 ENCOUNTER — TELEPHONE (OUTPATIENT)
Dept: PAIN MEDICINE | Facility: CLINIC | Age: 80
End: 2025-01-13
Payer: MEDICARE

## 2025-01-13 NOTE — TELEPHONE ENCOUNTER
Hub staff attempted to follow warm transfer process and was unsuccessful     Caller: Brandon Brooks    Relationship to patient: Self    Best call back number: [phone]*    Patient is needing: PT IS CALLING TO CHANGE HIS PROCEDURE APPOINTMENT SCHEDULED FOR 01/16/25 .. PLEASE ADVISE..

## 2025-01-14 NOTE — TELEPHONE ENCOUNTER
Caller: ZAY GUADALUPE    Phone Number: [phone] (home)     Reason for Call:   PATIENT CALLED IN TO GET PROCEDURE APPOINTMENT RESCHEDULED     WARM TRANSFER FAILED

## 2025-01-14 NOTE — TELEPHONE ENCOUNTER
Surgery/Procedure:  permanent spinal cord stimulator later implant with Abbott   Surgery/Procedure Date:  02/04/2024  Last visit:   Office Visit with Marcio Hinds PA-C (12/23/2024)   Next visit: 02/17/2025     Reason for call:    S/w patient and rescheduled procedure to 02/04/2025. Patient states that he has conflicting appointments that day.     Also, rescheduled follow up to 02/17/2025.     [facility]

## 2025-01-29 RX ORDER — CHLORHEXIDINE GLUCONATE 40 MG/ML
1 SOLUTION TOPICAL DAILY
Qty: 236 ML | Refills: 0 | Status: SHIPPED | OUTPATIENT
Start: 2025-01-29

## 2025-01-29 NOTE — TELEPHONE ENCOUNTER
Pre-op medications sent to pharmacy.     Attempted to contact patient to notify of Rx. No answer. LVM relaying the above information and instructions will be on the bottle. To call with any questions or concerns. Provided a call back number.

## 2025-02-04 ENCOUNTER — OUTSIDE FACILITY SERVICE (OUTPATIENT)
Dept: PAIN MEDICINE | Facility: CLINIC | Age: 80
End: 2025-02-04
Payer: MEDICARE

## 2025-02-04 ENCOUNTER — DOCUMENTATION (OUTPATIENT)
Dept: PAIN MEDICINE | Facility: CLINIC | Age: 80
End: 2025-02-04

## 2025-02-04 PROCEDURE — 63650 IMPLANT NEUROELECTRODES: CPT | Performed by: STUDENT IN AN ORGANIZED HEALTH CARE EDUCATION/TRAINING PROGRAM

## 2025-02-04 PROCEDURE — 63685 INS/RPLC SPI NPG/RCVR POCKET: CPT | Performed by: STUDENT IN AN ORGANIZED HEALTH CARE EDUCATION/TRAINING PROGRAM

## 2025-02-04 RX ORDER — HYDROCODONE BITARTRATE AND ACETAMINOPHEN 7.5; 325 MG/1; MG/1
1 TABLET ORAL EVERY 6 HOURS PRN
Qty: 12 TABLET | Refills: 0 | Status: SHIPPED | OUTPATIENT
Start: 2025-02-04

## 2025-02-04 NOTE — PROGRESS NOTES
[facility]  [address] [place] [address]    PROCEDURE: Spinal Cord Stimulator Implant, Two Leads    Device: ABBOTT - [employer]     PRE-OP DIAGNOSIS: Postlaminectomy syndrome  POST-OP DIAGNOSIS: Same    BLOOD THINNERS (ANTIPLATELETS/ANTICOAGULANTS): Were discussed with the patient and HERRERA Guidelines were followed.    CONSENT: Risks, benefits and options were explained to the patient, all questions were answered and written informed consent was obtained.     ANESTHESIA: GETS    ANTIBIOTICS: Ancef 2 gm IV    PROCEDURE NOTE: The patient was placed prone with the abdomen supported by a pillow and all pressure points were padded. Standard ASA monitors were applied. A timeout protocol was performed. Intravenous Antibiotics were administered prior to making incision. Proper protective gear was worn by the physician including a mask, scrub cap, sterile gown, and sterile gloves. The patient was prepped and draped in the usual sterile fashion using Chlorhexidine followed by sterile towels, laparotomy full body drape and Ioban. Fluoroscopy was then used to  identify target entry epidural space and needle entry site at the skin. Next, a 50/50 mixture containing 0.5% bupivacaine and 1% lidocaine with epinephrine was used to raise a skin wheal and anesthetize deeper tissues at the midline. Then a 15 blade scalpel was used to create a longitudinal midline incision down to the superficial fascial plane. Hemostasis was achieved throughout with a combination of pressure and electrocautery. Next, a 22 gauge 3.5 inch spinal needle was used to anesthetize the tract down to the target lamina. Using intermittent fluoroscopic guidance, a 14 gauge Tuohy needle was then advanced to contact the inferior lamina of the target entry interlaminar space. Onder contralateral oblique, AP and Lateral intermittent fluoroscopy the needle was then walked off in a superior medial direction and a guidewire  was used to facilitate entrance into the epidural space with a loss of resistance technique into the target interlaminar space at T12/L1. A stimulator lead was advanced through the Tuohy needle into the epidural space and directed to rest the tip at the top of the T7 vertebral body. The procedure was then repeated with a second 14 gauge Tuohy needle with again loss of resistance to wire at the same interlaminar space. The second stimulator lead was advanced through the Tuohy needle into the epidural space and directed to rest at the top of the T8 vertebral body. AP and lateral views were used to confirm appropriate posterior and midline position of the leads.  The needles were withdrawn from the epidural space leaving the leads carefully in place.     Leads were then anchored to the superficial fascia using device company anchoring device and secured with 0-0 Silk suture.     Attention was then directed to the LEFT flank site above the belt line for the Internal Pulse Generator (IPG). Skin was anesthetized in same fashion and a horizontal incision was made and dissected down approximately 1 cm deep. A combination of blunt and sharp dissection was used to create a small pocket for the IPG. Hemostasis was achieved throughout with a combination of pressure and electrocautery. Next, a tunneling device was used to bring the stimulator leads to the IPG pocket. The lead tips were cleaned and connected to the IPG. Impedance testing was performed by device company representative to confirm appropriate functioning of leads. The leads were then locked into the IPG. Both pocket and lead anchor incisions were thoroughly irrigated with 0.9% normal saline and the IPG battery was placed in the pocket. Care was taken to curl the leads under the battery during placement in the pocket. Finally, both incisions were closed. Deep layers were closed with 2-0 PDS suture in simple interrupted fashion and then subcuticular tissue was closed  with 3-0 Vicryl suture. Exofin was then used to approximate the incision edges. Occlusive sterile dressings were then applied to both of the surgical sites. An abdominal binder was placed on the patient. The patient was transported to the recovery room with standard ASA monitors in stable condition.    EBL: Less than 50mL    COMPLICATIONS: None. The patient tolerated the procedure well. Vital signs were stable. Sensory and motor exam was unchanged from baseline. The patient was observed in recovery for appropriate time and discharged in stable condition.    FOLLOW UP: as scheduled for post operative visit in clinic    ADDITIONAL NOTES: [n/a]    Final programming of the device was done in the recovery room by the device  representative. The patient (or responsible party) was given post-procedural and  discharge instructions to follow at home with an emphasis on instructions to avoid  infection and lead migration.    CODES:   63650 x 2  [id number]  [id number]

## 2025-02-07 ENCOUNTER — TELEPHONE (OUTPATIENT)
Dept: PAIN MEDICINE | Facility: CLINIC | Age: 80
End: 2025-02-07
Payer: MEDICARE

## 2025-02-07 DIAGNOSIS — Z96.89 S/P INSERTION OF SPINAL CORD STIMULATOR: Primary | ICD-10-CM

## 2025-02-07 NOTE — TELEPHONE ENCOUNTER
Attempted to contact patient to check To see how he was doing after his procedure on 2/4/2025.  Did not answer but did leave a voicemail

## 2025-02-07 NOTE — TELEPHONE ENCOUNTER
Called patient to check up to see how he was doing after his procedure on 2/4/2025.  He did not answer but we did leave a voicemail

## 2025-02-10 ENCOUNTER — OFFICE VISIT (OUTPATIENT)
Dept: PAIN MEDICINE | Facility: CLINIC | Age: 80
End: 2025-02-10
Payer: MEDICARE

## 2025-02-10 VITALS — BODY MASS INDEX: 28.01 KG/M2 | HEIGHT: 71 IN | WEIGHT: 200.1 LBS

## 2025-02-10 DIAGNOSIS — M96.1 POSTLAMINECTOMY SYNDROME: ICD-10-CM

## 2025-02-10 DIAGNOSIS — Z96.89 S/P INSERTION OF SPINAL CORD STIMULATOR: Primary | ICD-10-CM

## 2025-02-10 PROCEDURE — 1160F RVW MEDS BY RX/DR IN RCRD: CPT

## 2025-02-10 PROCEDURE — 99024 POSTOP FOLLOW-UP VISIT: CPT

## 2025-02-10 PROCEDURE — 1159F MED LIST DOCD IN RCRD: CPT

## 2025-02-10 PROCEDURE — 1125F AMNT PAIN NOTED PAIN PRSNT: CPT

## 2025-02-10 NOTE — PROGRESS NOTES
Referring Physician: No referring provider defined for this encounter.    Primary Physician: Juan Sosa MD    CHIEF COMPLAINT or REASON FOR VISIT: Follow-up (Post SCS implantation. 02/04/2025) and Back Pain      Initial history of present illness on 10/21/2024:  Mr. Brandon Brooks is 79 y.o. male who presents as a new patient referral for evaluation treatment chronic low back pain with radiation right lower extremity and foot.  Patient reports a multiyear history of chronic back issues and right lower extremity numbness tingling and pain.  Pain is historically been L5 dermatome.  He underwent PLIF with Dr. Sood at L5/S1 in March 2023.  Unfortunately the surgery did not seem to resolve him symptoms.  He does state that it was somewhat helpful but he continues to complain of the same numbness tingling and pain down his right leg.  He was again evaluated by neurosurgery, Lucy Chavira PA-C, PA-C, who did not identify any role for surgical intervention and referred to pain management.  He has tried gabapentin with minimal benefit in the past.  He has tried epidural steroid injections with minimal benefit in the past.  Patient denies any bowel or bladder dysfunction, lower extremity weakness, new onset saddle anesthesia or unexplained weight loss.       Interval history:  Patient returns to clinic today after undergoing a permanent SCS implant with Wang.  He is recovering appropriately from this procedure.  He denies any fever, chills, or drainage from his incisions.  He is not noticing significant pain relief yet.  He is doing an excellent job keeping his incisions clean and dry.  We discussed appropriate wound care instructions as well as continued postoperative restrictions.      Interventions:  12/19/2024: SCS trial Abbott with 50% relief  2/4/2025: SCS permanent implant Abbott (top of T7/top of T8) with    Objective Pain Scoring:   BRIEF PAIN INVENTORY:  Total score:   Pain Score    02/10/25 1310    PainSc:   5   PainLoc: Back        PHQ-2: 0  PHQ-9:    Opioid Risk Tool:         Review of Systems:   ROS negative except as otherwise noted     Past Medical History:   Past Medical History:   Diagnosis Date    Arthritis     Cancer     prostate cancer    GERD (gastroesophageal reflux disease)     Hearing loss     Hypertension     Irregular heart beat     Low back pain     PONV (postoperative nausea and vomiting)          Past Surgical History:   Past Surgical History:   Procedure Laterality Date    CATARACT EXTRACTION Bilateral     HERNIA REPAIR      LUMBAR DISC SURGERY  1990    Dr cameron    LUMBAR LAMINECTOMY WITH FUSION N/A 3/27/2023    Procedure: LUMBAR FUSION DECOMPRESSON WITH PEDICLE SCREWS L5-S1;  Surgeon: Lenin Sood MD;  Location: [facility];  Service: Neurosurgery;  Laterality: N/A;    PROSTATECTOMY           Family History   Family History   Problem Relation Age of Onset    Cancer Mother     Heart disease Father     Cancer Brother          Social History   Social History     Socioeconomic History    Marital status:    Tobacco Use    Smoking status: Never    Smokeless tobacco: Never   Vaping Use    Vaping status: Never Used   Substance and Sexual Activity    Alcohol use: Never    Drug use: Never    Sexual activity: Defer        Medications:     Current Outpatient Medications:     acetaminophen (TYLENOL) 325 MG tablet, Take 2 tablets by mouth Every 6 (Six) Hours As Needed for Mild Pain. On rare occassion, Disp: , Rfl:     aspirin 81 MG chewable tablet, Chew 1 tablet Daily., Disp: , Rfl:     cyclobenzaprine (FLEXERIL) 10 MG tablet, Take 1 tablet by mouth 3 (Three) Times a Day As Needed for Muscle Spasms., Disp: 30 tablet, Rfl: 0    HYDROcodone-acetaminophen (NORCO) 7.5-325 MG per tablet, Take 1 tablet by mouth Every 6 (Six) Hours As Needed for Moderate Pain., Disp: 12 tablet, Rfl: 0    lisinopril-hydrochlorothiazide (PRINZIDE,ZESTORETIC) 20-12.5 MG per tablet, Take 1 tablet by mouth Daily., Disp:  ", Rfl:     PEG 3350-KCl-NaBcb-NaCl-NaSulf (PEG-3350/Electrolytes) 236 g reconstituted solution, , Disp: , Rfl:     pregabalin (LYRICA) 50 MG capsule, Take 1 capsule by mouth 2 (Two) Times a Day., Disp: 60 capsule, Rfl: 1    cloNIDine (CATAPRES) 0.1 MG tablet, Take 1 tablet by mouth As Needed. Take one for systolic higher than 165 or diastole higher than 100 (Patient not taking: Reported on 10/21/2024), Disp: , Rfl:     ezetimibe (ZETIA) 10 MG tablet, Take 1 tablet by mouth Daily. (Patient not taking: Reported on 10/21/2024), Disp: , Rfl:     metoprolol succinate XL (TOPROL-XL) 25 MG 24 hr tablet, Take 1 tablet by mouth Daily., Disp: , Rfl:         Physical Exam:     Vitals:    02/10/25 1310   Weight: 90.8 kg (200 lb 1.6 oz)   Height: 180.3 cm (70.98\")   PainSc:   5   PainLoc: Back          General: Alert and oriented, No acute distress.   HEENT: Normocephalic, atraumatic.   Cardiovascular: No gross edema  Respiratory: Respirations are non-labored       Lumbar Spine:   No masses or atrophy  Range of motion - Flexion normal. Extension reduced.    Facet Loading: Positive bilaterally  Facet Palpation - Nontender   Ana Luisa finger/Gaenslen's/Wilber's/MARCO ANTONIO/Thigh thrust -   Straight leg raise/slump test: Negative bilaterally  Multifidus toe-touch test:    Motor Exam:       Strength: Rate on 1-5 scale Right Left    L1/2- hip flexion 5/5  5/5    L3- knee extension 5/5  5/5    L4- ankle dorsiflexion 5/5  5/5    L5- great toe extension 5/5  5/5    S1- ankle plantarflexion 5/5  5/5    Sensory Exam: Altered sensation right lower extremity       Neurologic: Cranial Nerves II-XII are grossly intact.      Psychiatric: Cooperative.   Gait: Normal   Assistive Devices: None      IPG incision site and percutaneous lead insertion incision site continue to heal appropriately without surrounding erythema, inflammation, induration, or purulent drainage.  Incisions are clean dry and intact.  We discussed appropriate wound care " instructions.  Imaging Studies:   Results for orders placed during the hospital encounter of 10/09/24    MRI Lumbar Spine Without Contrast    Narrative  MRI LUMBAR SPINE WO CONTRAST    Date of Exam: 10/9/2024 10:29 AM EDT    Indication: h/o fusion, new lumbar and sciatic pain.    Comparison: None available.    Technique:  Routine multiplanar/multisequence sequence images of the lumbar spine were obtained without contrast administration.      Findings:  There are normal vertebral body heights. There is straightening of curvature.    Conus terminates at L1 and appears grossly normal. There is a small subligamentous disc protrusion at T12-L1 without significant spinal stenosis. There is slight disc bulge at L1-2 without spinal stenosis or neural foraminal narrowing.    There is mild to moderate narrowing of the L2-3 disc and there is slight retrolisthesis of L2 on L3. There is facet joint hypertrophy. There is a moderate degree of spinal stenosis. There is mild bilateral neural foraminal narrowing without focal nerve  root impingement.    There is mild retrolisthesis of L3 on L4. Severe disc narrowing. There is facet joint hypertrophy. There is moderately severe spinal stenosis. There is moderately severe neural foraminal stenosis with impingement of portions of exiting L3 nerve roots  bilaterally.    There is moderately severe narrowing of the L4-5 disc. There is facet joint hypertrophy. There is moderate narrowing of the canal in the sagittal plane. There is moderate bilateral neural foraminal stenosis with some impingement of exiting L4 nerve roots  bilaterally.    There is artifact from L5 and S1 pedicle screws. There are posterior laminectomy defects at this level. There is no spinal stenosis at L5-S1. There is normal alignment at the operative site. Artifact obscures detail of the neural foramina.    Impression  Impression:  Moderate spinal stenosis L2-3 and L4-5. Moderately severe spinal stenosis L3-4. Bilateral  neural foraminal impingement L3-4 and L4-5. Postsurgical changes L5-S1.      Electronically Signed: Hoa Wright MD  10/10/2024 12:36 PM EDT  Workstation ID: [id number]        Independent review of radiographic imaging: Available for my interpretation is a lumbar MRI dated October 9, 2024 demonstrating L5/S1 PLIF; L2/L3 moderate canal stenosis; moderate right L3/L4 neuroforaminal stenosis; endplate edema at L2/L3 and L3/L4.    Impression & Plan:       10/21/2024: Brandon Brooks is a 79 y.o. male with past medical history significant for arthritis, CKD, diabetes, HLD, HTN, laminectomy with L5/S1 fusion with Dr. Sood in March 2023 who presents to the pain clinic for evaluation and treatment of persistent chronic low back pain with radiation right lower extremity.  MRI interpretation as above.  Evaluation consistent with postlaminectomy syndrome.  We discussed spinal cord stimulator trial.  I had a discussion with the patient regarding the risks of the procedure including bleeding, infection, damage to surrounding structures, infection, migration.  Additionally we will trial pregabalin.  11/19/2024: Will obtain appropriate psychiatric clearance.  Increase pregabalin.  Will plan for spinal cord stimulator trial with Abbott  12/23/2024: Good relief from SCS trial with Abbott.  Will plan for permanent implant.  2/10/2025: 1 week s/p permanent SCS implant with Abbott.  Patient recovering appropriately.  Incisions healing appropriately.  Discussed continued postoperative restrictions as well as wound care instructions.    1. S/P insertion of spinal cord stimulator    2. Postlaminectomy syndrome              PLAN:  1. Medication Recommendations: Recommend Voltaren topical, NSAIDs, Tylenol.  Can trial turmeric 500 mg twice daily if NSAID contraindicated.      2. Physical Therapy: Continue HEP  -Patient should avoid bending, twisting, reaching, lifting to prevent lead migration.    3. Psychological:    4. Complementary and  alternative (CAM) Therapies:     5. Labs/Diagnostic studies: Compliant UDS as of 2024    6. Imagin. Interventions: 1 week s/p permanent SCS implant with Abbott  -Continue postoperative restrictions  -Patient is to let warm soapy water run over incision but to avoid any vigorous scrubbing or soaking.  He understands signs symptoms relating to infection and knows to contact our office immediately if he experiences any changes to his incision.    8. Referrals: None indicated     9. Records:     10. Lifestyle goals:    Follow-up as scheduled on 2025.      [facility]  Marcio Hinds PA-C          Quality Metrics:

## 2025-02-17 ENCOUNTER — OFFICE VISIT (OUTPATIENT)
Dept: PAIN MEDICINE | Facility: CLINIC | Age: 80
End: 2025-02-17
Payer: MEDICARE

## 2025-02-17 VITALS — WEIGHT: 202 LBS | BODY MASS INDEX: 28.28 KG/M2 | HEIGHT: 71 IN

## 2025-02-17 DIAGNOSIS — Z96.89 S/P INSERTION OF SPINAL CORD STIMULATOR: Primary | ICD-10-CM

## 2025-02-17 DIAGNOSIS — M96.1 POSTLAMINECTOMY SYNDROME: ICD-10-CM

## 2025-02-17 PROCEDURE — 1159F MED LIST DOCD IN RCRD: CPT

## 2025-02-17 PROCEDURE — G2211 COMPLEX E/M VISIT ADD ON: HCPCS

## 2025-02-17 PROCEDURE — 1160F RVW MEDS BY RX/DR IN RCRD: CPT

## 2025-02-17 PROCEDURE — 1125F AMNT PAIN NOTED PAIN PRSNT: CPT

## 2025-02-17 PROCEDURE — 99024 POSTOP FOLLOW-UP VISIT: CPT

## 2025-02-17 NOTE — PROGRESS NOTES
Referring Physician: No referring provider defined for this encounter.    Primary Physician: Juan Sosa MD    CHIEF COMPLAINT or REASON FOR VISIT: Follow-up, Back Pain, and 10-14 day follow up/post SCS implant      Initial history of present illness on 10/21/2024:  Mr. Brandon Brooks is 79 y.o. male who presents as a new patient referral for evaluation treatment chronic low back pain with radiation right lower extremity and foot.  Patient reports a multiyear history of chronic back issues and right lower extremity numbness tingling and pain.  Pain is historically been L5 dermatome.  He underwent PLIF with Dr. Sood at L5/S1 in March 2023.  Unfortunately the surgery did not seem to resolve him symptoms.  He does state that it was somewhat helpful but he continues to complain of the same numbness tingling and pain down his right leg.  He was again evaluated by neurosurgery, Lucy Chavira PA-C, PA-C, who did not identify any role for surgical intervention and referred to pain management.  He has tried gabapentin with minimal benefit in the past.  He has tried epidural steroid injections with minimal benefit in the past.  Patient denies any bowel or bladder dysfunction, lower extremity weakness, new onset saddle anesthesia or unexplained weight loss.       Interval history:  Patient returns to clinic today.  He is approximately 2 weeks s/p permanent SCS implant with Abbott.  So far, patient reports good relief from this device.  He has noticed a reduction in his pain.  He still has numbness and tingling within his left foot.  He was previously taking pregabalin however he would like to not take any additional medications unless he has to.  Continued postoperative restrictions.  Incisions healing appropriately.  No signs of systemic or local infection.  He does feel as if he is walking better secondary to pain relief.      Interventions:  12/19/2024: SCS trial Abbott with 50% relief  2/4/2025: SCS permanent  implant Abbott (top of T7/top of T8) with    Objective Pain Scoring:   BRIEF PAIN INVENTORY:  Total score:   Pain Score    02/17/25 0914   PainSc:   4   PainLoc: Back          PHQ-2: 0  PHQ-9:    Opioid Risk Tool:         Review of Systems:   ROS negative except as otherwise noted     Past Medical History:   Past Medical History:   Diagnosis Date    Arthritis     Cancer     prostate cancer    GERD (gastroesophageal reflux disease)     Hearing loss     Hypertension     Irregular heart beat     Low back pain     PONV (postoperative nausea and vomiting)          Past Surgical History:   Past Surgical History:   Procedure Laterality Date    CATARACT EXTRACTION Bilateral     HERNIA REPAIR      LUMBAR DISC SURGERY  1990    Dr cameron    LUMBAR LAMINECTOMY WITH FUSION N/A 3/27/2023    Procedure: LUMBAR FUSION DECOMPRESSON WITH PEDICLE SCREWS L5-S1;  Surgeon: Lenin Sood MD;  Location: [facility];  Service: Neurosurgery;  Laterality: N/A;    PROSTATECTOMY           Family History   Family History   Problem Relation Age of Onset    Cancer Mother     Heart disease Father     Cancer Brother          Social History   Social History     Socioeconomic History    Marital status:    Tobacco Use    Smoking status: Never    Smokeless tobacco: Never   Vaping Use    Vaping status: Never Used   Substance and Sexual Activity    Alcohol use: Never    Drug use: Never    Sexual activity: Defer        Medications:     Current Outpatient Medications:     acetaminophen (TYLENOL) 325 MG tablet, Take 2 tablets by mouth Every 6 (Six) Hours As Needed for Mild Pain. On rare occassion, Disp: , Rfl:     aspirin 81 MG chewable tablet, Chew 1 tablet Daily., Disp: , Rfl:     cyclobenzaprine (FLEXERIL) 10 MG tablet, Take 1 tablet by mouth 3 (Three) Times a Day As Needed for Muscle Spasms., Disp: 30 tablet, Rfl: 0    lisinopril-hydrochlorothiazide (PRINZIDE,ZESTORETIC) 20-12.5 MG per tablet, Take 1 tablet by mouth Daily., Disp: , Rfl:     PEG  "3350-KCl-NaBcb-NaCl-NaSulf (PEG-3350/Electrolytes) 236 g reconstituted solution, , Disp: , Rfl:     pregabalin (LYRICA) 50 MG capsule, Take 1 capsule by mouth 2 (Two) Times a Day., Disp: 60 capsule, Rfl: 1    cloNIDine (CATAPRES) 0.1 MG tablet, Take 1 tablet by mouth As Needed. Take one for systolic higher than 165 or diastole higher than 100 (Patient not taking: Reported on 10/21/2024), Disp: , Rfl:     ezetimibe (ZETIA) 10 MG tablet, Take 1 tablet by mouth Daily. (Patient not taking: Reported on 10/21/2024), Disp: , Rfl:     HYDROcodone-acetaminophen (NORCO) 7.5-325 MG per tablet, Take 1 tablet by mouth Every 6 (Six) Hours As Needed for Moderate Pain. (Patient not taking: Reported on 2/17/2025), Disp: 12 tablet, Rfl: 0    metoprolol succinate XL (TOPROL-XL) 25 MG 24 hr tablet, Take 1 tablet by mouth Daily., Disp: , Rfl:         Physical Exam:     Vitals:    02/17/25 0914   Weight: 91.6 kg (202 lb)   Height: 180.3 cm (70.98\")   PainSc:   4   PainLoc: Back            General: Alert and oriented, No acute distress.   HEENT: Normocephalic, atraumatic.   Cardiovascular: No gross edema  Respiratory: Respirations are non-labored       Lumbar Spine:   No masses or atrophy  Range of motion - Flexion normal. Extension reduced.    Facet Loading: Positive bilaterally  Facet Palpation - Nontender   Ana Luisa finger/Gaenslen's/Wilber's/MARCO ANTONIO/Thigh thrust -   Straight leg raise/slump test: Negative bilaterally  Multifidus toe-touch test:    Motor Exam:       Strength: Rate on 1-5 scale Right Left    L1/2- hip flexion 5/5  5/5    L3- knee extension 5/5  5/5    L4- ankle dorsiflexion 5/5  5/5    L5- great toe extension 5/5  5/5    S1- ankle plantarflexion 5/5  5/5    Sensory Exam: Altered sensation right lower extremity       Neurologic: Cranial Nerves II-XII are grossly intact.      Psychiatric: Cooperative.   Gait: Normal   Assistive Devices: None      IPG incision site and percutaneous lead insertion incision site continue " to heal appropriately without surrounding erythema, inflammation, induration, or purulent drainage.  Incisions are clean dry and intact.  We discussed appropriate wound care instructions.  Imaging Studies:   Results for orders placed during the hospital encounter of 10/09/24    MRI Lumbar Spine Without Contrast    Narrative  MRI LUMBAR SPINE WO CONTRAST    Date of Exam: 10/9/2024 10:29 AM EDT    Indication: h/o fusion, new lumbar and sciatic pain.    Comparison: None available.    Technique:  Routine multiplanar/multisequence sequence images of the lumbar spine were obtained without contrast administration.      Findings:  There are normal vertebral body heights. There is straightening of curvature.    Conus terminates at L1 and appears grossly normal. There is a small subligamentous disc protrusion at T12-L1 without significant spinal stenosis. There is slight disc bulge at L1-2 without spinal stenosis or neural foraminal narrowing.    There is mild to moderate narrowing of the L2-3 disc and there is slight retrolisthesis of L2 on L3. There is facet joint hypertrophy. There is a moderate degree of spinal stenosis. There is mild bilateral neural foraminal narrowing without focal nerve  root impingement.    There is mild retrolisthesis of L3 on L4. Severe disc narrowing. There is facet joint hypertrophy. There is moderately severe spinal stenosis. There is moderately severe neural foraminal stenosis with impingement of portions of exiting L3 nerve roots  bilaterally.    There is moderately severe narrowing of the L4-5 disc. There is facet joint hypertrophy. There is moderate narrowing of the canal in the sagittal plane. There is moderate bilateral neural foraminal stenosis with some impingement of exiting L4 nerve roots  bilaterally.    There is artifact from L5 and S1 pedicle screws. There are posterior laminectomy defects at this level. There is no spinal stenosis at L5-S1. There is normal alignment at the  operative site. Artifact obscures detail of the neural foramina.    Impression  Impression:  Moderate spinal stenosis L2-3 and L4-5. Moderately severe spinal stenosis L3-4. Bilateral neural foraminal impingement L3-4 and L4-5. Postsurgical changes L5-S1.      Electronically Signed: Hoa Wright MD  10/10/2024 12:36 PM EDT  Workstation ID: [id number]        Independent review of radiographic imaging: Available for my interpretation is a lumbar MRI dated October 9, 2024 demonstrating L5/S1 PLIF; L2/L3 moderate canal stenosis; moderate right L3/L4 neuroforaminal stenosis; endplate edema at L2/L3 and L3/L4.    Impression & Plan:       10/21/2024: Brandon Brooks is a 79 y.o. male with past medical history significant for arthritis, CKD, diabetes, HLD, HTN, laminectomy with L5/S1 fusion with Dr. Sood in March 2023 who presents to the pain clinic for evaluation and treatment of persistent chronic low back pain with radiation right lower extremity.  MRI interpretation as above.  Evaluation consistent with postlaminectomy syndrome.  We discussed spinal cord stimulator trial.  I had a discussion with the patient regarding the risks of the procedure including bleeding, infection, damage to surrounding structures, infection, migration.  Additionally we will trial pregabalin.  11/19/2024: Will obtain appropriate psychiatric clearance.  Increase pregabalin.  Will plan for spinal cord stimulator trial with Abbott  12/23/2024: Good relief from SCS trial with Abbott.  Will plan for permanent implant.  2/10/2025: 1 week s/p permanent SCS implant with Abbott.  Patient recovering appropriately.  Incisions healing appropriately.  Discussed continued postoperative restrictions as well as wound care instructions.  2/17/2025: 2-week s/p permanent SCS implant with Abbott.  Good relief thus far.  Patient recovering appropriately incisions healing appropriately.  Continue postoperative restrictions.  Discussed wound care instructions.    1.  S/P insertion of spinal cord stimulator    2. Postlaminectomy syndrome                PLAN:  1. Medication Recommendations: Recommend Voltaren topical, NSAIDs, Tylenol.  Can trial turmeric 500 mg twice daily if NSAID contraindicated.  -Can consider restarting pregabalin; patient prefers to avoid medications if he can      2. Physical Therapy: Continue HEP  -Patient should avoid bending, twisting, reaching, lifting for at least 1 more month to prevent lead migration.    3. Psychological:    4. Complementary and alternative (CAM) Therapies:     5. Labs/Diagnostic studies: Compliant UDS as of 2024    6. Imagin. Interventions: 2 weeks week s/p permanent SCS implant with Abbott  -Continue postoperative restrictions  -Patient is to let warm soapy water run over incision but to avoid any vigorous scrubbing or soaking.  He understands signs symptoms relating to infection and knows to contact our office immediately if he experiences any changes to his incision.    8. Referrals: None indicated     9. Records:     10. Lifestyle goals:    Follow-up 1 month      [facility]  Marcio Hidns PA-C          Quality Metrics:

## 2025-03-17 ENCOUNTER — OFFICE VISIT (OUTPATIENT)
Dept: PAIN MEDICINE | Facility: CLINIC | Age: 80
End: 2025-03-17
Payer: MEDICARE

## 2025-03-17 VITALS — WEIGHT: 207 LBS | HEIGHT: 71 IN | BODY MASS INDEX: 28.98 KG/M2

## 2025-03-17 DIAGNOSIS — M96.1 POSTLAMINECTOMY SYNDROME: ICD-10-CM

## 2025-03-17 DIAGNOSIS — Z96.89 S/P INSERTION OF SPINAL CORD STIMULATOR: Primary | ICD-10-CM

## 2025-03-17 PROCEDURE — 1125F AMNT PAIN NOTED PAIN PRSNT: CPT

## 2025-03-17 PROCEDURE — 1159F MED LIST DOCD IN RCRD: CPT

## 2025-03-17 PROCEDURE — G2211 COMPLEX E/M VISIT ADD ON: HCPCS

## 2025-03-17 PROCEDURE — 99213 OFFICE O/P EST LOW 20 MIN: CPT

## 2025-03-17 PROCEDURE — 1160F RVW MEDS BY RX/DR IN RCRD: CPT

## 2025-03-17 RX ORDER — TRIAMCINOLONE ACETONIDE 1 MG/G
CREAM TOPICAL
COMMUNITY
Start: 2025-03-13

## 2025-03-17 NOTE — PROGRESS NOTES
Referring Physician: No referring provider defined for this encounter.    Primary Physician: Juan Sosa MD    CHIEF COMPLAINT or REASON FOR VISIT: S/P insertion of spinal cord stimulator      Initial history of present illness on 10/21/2024:  Mr. Brandon Brooks is 79 y.o. male who presents as a new patient referral for evaluation treatment chronic low back pain with radiation right lower extremity and foot.  Patient reports a multiyear history of chronic back issues and right lower extremity numbness tingling and pain.  Pain is historically been L5 dermatome.  He underwent PLIF with Dr. Sood at L5/S1 in March 2023.  Unfortunately the surgery did not seem to resolve him symptoms.  He does state that it was somewhat helpful but he continues to complain of the same numbness tingling and pain down his right leg.  He was again evaluated by neurosurgery, Lucy Chavira PA-C, PA-C, who did not identify any role for surgical intervention and referred to pain management.  He has tried gabapentin with minimal benefit in the past.  He has tried epidural steroid injections with minimal benefit in the past.  Patient denies any bowel or bladder dysfunction, lower extremity weakness, new onset saddle anesthesia or unexplained weight loss.       Interval history:  Patient returns to clinic today.  He is approximately 6 weeks out from a spinal cord stimulator implant with [employer].  He is recovering appropriately from this procedure.  He has noticed improvement in his back pain but not his right lower extremity pain.  He was having some pruritus which has resolved with the cream prescribed by his dermatologist.  We did discuss that his postoperative restrictions have been lifted today.  He did have a small fall on Saturday in which he landed on his right hip.  He denies any pain or injury from this event.  He denies hitting or injuring his head.  Denies any headache, vision changes, loss of consciousness, or trauma to  his head.    Interventions:  12/19/2024: SCS trial [employer] with 50% relief  2/4/2025: SCS permanent implant [employer] (top of T7/top of T8) with 40% relief ongoing    Objective Pain Scoring:   BRIEF PAIN INVENTORY:  Total score:   Pain Score    03/17/25 0949   PainSc: 3    PainLoc: Back            PHQ-2: 0  PHQ-9:    Opioid Risk Tool:         Review of Systems:   ROS negative except as otherwise noted     Past Medical History:   Past Medical History:   Diagnosis Date    Arthritis     Cancer     prostate cancer    GERD (gastroesophageal reflux disease)     Hearing loss     Hypertension     Irregular heart beat     Low back pain     PONV (postoperative nausea and vomiting)          Past Surgical History:   Past Surgical History:   Procedure Laterality Date    CATARACT EXTRACTION Bilateral     HERNIA REPAIR      LUMBAR DISC SURGERY  1990    Dr cameron    LUMBAR LAMINECTOMY WITH FUSION N/A 3/27/2023    Procedure: LUMBAR FUSION DECOMPRESSON WITH PEDICLE SCREWS L5-S1;  Surgeon: Lenin Sood MD;  Location: [facility];  Service: Neurosurgery;  Laterality: N/A;    PROSTATECTOMY           Family History   Family History   Problem Relation Age of Onset    Cancer Mother     Heart disease Father     Cancer Brother          Social History   Social History     Socioeconomic History    Marital status:    Tobacco Use    Smoking status: Never    Smokeless tobacco: Never   Vaping Use    Vaping status: Never Used   Substance and Sexual Activity    Alcohol use: Never    Drug use: Never    Sexual activity: Defer        Medications:     Current Outpatient Medications:     acetaminophen (TYLENOL) 325 MG tablet, Take 2 tablets by mouth Every 6 (Six) Hours As Needed for Mild Pain. On rare occassion, Disp: , Rfl:     aspirin 81 MG chewable tablet, Chew 1 tablet Daily., Disp: , Rfl:     cyclobenzaprine (FLEXERIL) 10 MG tablet, Take 1 tablet by mouth 3 (Three) Times a Day As Needed for Muscle Spasms., Disp: 30 tablet, Rfl: 0     "lisinopril-hydrochlorothiazide (PRINZIDE,ZESTORETIC) 20-12.5 MG per tablet, Take 1 tablet by mouth Daily., Disp: , Rfl:     PEG 3350-KCl-NaBcb-NaCl-NaSulf (PEG-3350/Electrolytes) 236 g reconstituted solution, , Disp: , Rfl:     pregabalin (LYRICA) 50 MG capsule, Take 1 capsule by mouth 2 (Two) Times a Day., Disp: 60 capsule, Rfl: 1    triamcinolone (KENALOG) 0.1 % cream, , Disp: , Rfl:     cloNIDine (CATAPRES) 0.1 MG tablet, Take 1 tablet by mouth As Needed. Take one for systolic higher than 165 or diastole higher than 100 (Patient not taking: Reported on 10/21/2024), Disp: , Rfl:     ezetimibe (ZETIA) 10 MG tablet, Take 1 tablet by mouth Daily. (Patient not taking: Reported on 10/21/2024), Disp: , Rfl:     HYDROcodone-acetaminophen (NORCO) 7.5-325 MG per tablet, Take 1 tablet by mouth Every 6 (Six) Hours As Needed for Moderate Pain. (Patient not taking: Reported on 3/17/2025), Disp: 12 tablet, Rfl: 0    metoprolol succinate XL (TOPROL-XL) 25 MG 24 hr tablet, Take 1 tablet by mouth Daily., Disp: , Rfl:         Physical Exam:     Vitals:    03/17/25 0949   Weight: 93.9 kg (207 lb)   Height: 180.3 cm (70.98\")   PainSc: 3    PainLoc: Back              General: Alert and oriented, No acute distress.   HEENT: Normocephalic, atraumatic.   Pupils are round equal and reactive to light.  No signs of direct trauma or injury.  Cardiovascular: No gross edema  Respiratory: Respirations are non-labored       Lumbar Spine:   No masses or atrophy  Range of motion - Flexion normal. Extension reduced.    Facet Loading: Positive bilaterally  Facet Palpation - Nontender   Ana Luisa finger/Gaenslen's/Wilber's/MARCO ANTONIO/Thigh thrust -   Straight leg raise/slump test: Negative bilaterally  Multifidus toe-touch test:    Motor Exam:       Strength: Rate on 1-5 scale Right Left    L1/2- hip flexion 5/5  5/5    L3- knee extension 5/5  5/5    L4- ankle dorsiflexion 5/5 5/5    L5- great toe extension 5/5 5/5    S1- ankle plantarflexion 5/5  " 5/5    Sensory Exam: Altered sensation right lower extremity       Neurologic: Cranial Nerves II-XII are grossly intact.      Psychiatric: Cooperative.   Gait: Normal   Assistive Devices: None      IPG incision site and percutaneous lead insertion incision site continue to heal appropriately without surrounding erythema, inflammation, induration, or purulent drainage.  Incisions are clean dry and intact.    Imaging Studies:   Results for orders placed during the hospital encounter of 10/09/24    MRI Lumbar Spine Without Contrast    Narrative  MRI LUMBAR SPINE WO CONTRAST    Date of Exam: 10/9/2024 10:29 AM EDT    Indication: h/o fusion, new lumbar and sciatic pain.    Comparison: None available.    Technique:  Routine multiplanar/multisequence sequence images of the lumbar spine were obtained without contrast administration.      Findings:  There are normal vertebral body heights. There is straightening of curvature.    Conus terminates at L1 and appears grossly normal. There is a small subligamentous disc protrusion at T12-L1 without significant spinal stenosis. There is slight disc bulge at L1-2 without spinal stenosis or neural foraminal narrowing.    There is mild to moderate narrowing of the L2-3 disc and there is slight retrolisthesis of L2 on L3. There is facet joint hypertrophy. There is a moderate degree of spinal stenosis. There is mild bilateral neural foraminal narrowing without focal nerve  root impingement.    There is mild retrolisthesis of L3 on L4. Severe disc narrowing. There is facet joint hypertrophy. There is moderately severe spinal stenosis. There is moderately severe neural foraminal stenosis with impingement of portions of exiting L3 nerve roots  bilaterally.    There is moderately severe narrowing of the L4-5 disc. There is facet joint hypertrophy. There is moderate narrowing of the canal in the sagittal plane. There is moderate bilateral neural foraminal stenosis with some impingement of  exiting L4 nerve roots  bilaterally.    There is artifact from L5 and S1 pedicle screws. There are posterior laminectomy defects at this level. There is no spinal stenosis at L5-S1. There is normal alignment at the operative site. Artifact obscures detail of the neural foramina.    Impression  Impression:  Moderate spinal stenosis L2-3 and L4-5. Moderately severe spinal stenosis L3-4. Bilateral neural foraminal impingement L3-4 and L4-5. Postsurgical changes L5-S1.      Electronically Signed: Hoa Wright MD  10/10/2024 12:36 PM EDT  Workstation ID: [id number]        Independent review of radiographic imaging: Available for my interpretation is a lumbar MRI dated October 9, 2024 demonstrating L5/S1 PLIF; L2/L3 moderate canal stenosis; moderate right L3/L4 neuroforaminal stenosis; endplate edema at L2/L3 and L3/L4.    Impression & Plan:       10/21/2024: Brandon Brooks is a 79 y.o. male with past medical history significant for arthritis, CKD, diabetes, HLD, HTN, laminectomy with L5/S1 fusion with Dr. Sood in March 2023 who presents to the pain clinic for evaluation and treatment of persistent chronic low back pain with radiation right lower extremity.  MRI interpretation as above.  Evaluation consistent with postlaminectomy syndrome.  We discussed spinal cord stimulator trial.  I had a discussion with the patient regarding the risks of the procedure including bleeding, infection, damage to surrounding structures, infection, migration.  Additionally we will trial pregabalin.  11/19/2024: Will obtain appropriate psychiatric clearance.  Increase pregabalin.  Will plan for spinal cord stimulator trial with [employer]  12/23/2024: Good relief from SCS trial with [employer].  Will plan for permanent implant.  2/10/2025: 1 week s/p permanent SCS implant with [employer].  Patient recovering appropriately.  Incisions healing appropriately.  Discussed continued postoperative restrictions as well as wound care instructions.  2/17/2025:  2-week s/p permanent SCS implant with [employer].  Good relief thus far.  Patient recovering appropriately incisions healing appropriately.  Continue postoperative restrictions.  Discussed wound care instructions.  3/17/2025: 6 weeks s/p permanent SCS implant with [employer].  Good relief of lower back pain.  Continued right lower extremity symptoms.  [employer] device rep here for reprogramming.  Postoperative restrictions lifted.  Incisions healing appropriately.    1. S/P insertion of spinal cord stimulator    2. Postlaminectomy syndrome                  PLAN:  1. Medication Recommendations: Recommend Voltaren topical, NSAIDs, Tylenol.  Can trial turmeric 500 mg twice daily if NSAID contraindicated.  -Can consider restarting pregabalin; patient prefers to avoid medications if he can      2. Physical Therapy: Continue HEP      3. Psychological:    4. Complementary and alternative (CAM) Therapies:     5. Labs/Diagnostic studies: Compliant UDS as of 2024    6. Imagin. Interventions: 6 weeks weeks week s/p permanent SCS implant with [employer]  -Postoperative restrictions lifted  Incisions are healing appropriately without surrounding erythema, inflammation, induration, or purulent drainage.  Incisions are clean dry and intact    8. Referrals: None indicated     9. Records:     10. Lifestyle goals:    Follow-up 6 weeks    [facility]  Marcio Hinds PA-C          Quality Metrics:

## 2025-04-28 ENCOUNTER — LAB (OUTPATIENT)
Dept: LAB | Facility: HOSPITAL | Age: 80
End: 2025-04-28
Payer: MEDICARE

## 2025-04-28 ENCOUNTER — OFFICE VISIT (OUTPATIENT)
Dept: PAIN MEDICINE | Facility: CLINIC | Age: 80
End: 2025-04-28
Payer: MEDICARE

## 2025-04-28 VITALS — WEIGHT: 203.1 LBS | BODY MASS INDEX: 28.43 KG/M2 | HEIGHT: 71 IN

## 2025-04-28 DIAGNOSIS — M96.1 POSTLAMINECTOMY SYNDROME: ICD-10-CM

## 2025-04-28 DIAGNOSIS — R21 RASH: Primary | ICD-10-CM

## 2025-04-28 DIAGNOSIS — R21 RASH: ICD-10-CM

## 2025-04-28 DIAGNOSIS — Z96.89 S/P INSERTION OF SPINAL CORD STIMULATOR: ICD-10-CM

## 2025-04-28 LAB
BASOPHILS # BLD AUTO: 0.07 10*3/MM3 (ref 0–0.2)
BASOPHILS NFR BLD AUTO: 1 % (ref 0–1.5)
CRP SERPL-MCNC: 0.79 MG/DL (ref 0–0.5)
DEPRECATED RDW RBC AUTO: 42.5 FL (ref 37–54)
EOSINOPHIL # BLD AUTO: 0.37 10*3/MM3 (ref 0–0.4)
EOSINOPHIL NFR BLD AUTO: 5.3 % (ref 0.3–6.2)
ERYTHROCYTE [DISTWIDTH] IN BLOOD BY AUTOMATED COUNT: 12.3 % (ref 12.3–15.4)
ERYTHROCYTE [SEDIMENTATION RATE] IN BLOOD: 14 MM/HR (ref 0–20)
HCT VFR BLD AUTO: 45.7 % (ref 37.5–51)
HGB BLD-MCNC: 16 G/DL (ref 13–17.7)
IMM GRANULOCYTES # BLD AUTO: 0.02 10*3/MM3 (ref 0–0.05)
IMM GRANULOCYTES NFR BLD AUTO: 0.3 % (ref 0–0.5)
LYMPHOCYTES # BLD AUTO: 1.85 10*3/MM3 (ref 0.7–3.1)
LYMPHOCYTES NFR BLD AUTO: 26.4 % (ref 19.6–45.3)
MCH RBC QN AUTO: 33.3 PG (ref 26.6–33)
MCHC RBC AUTO-ENTMCNC: 35 G/DL (ref 31.5–35.7)
MCV RBC AUTO: 95 FL (ref 79–97)
MONOCYTES # BLD AUTO: 0.6 10*3/MM3 (ref 0.1–0.9)
MONOCYTES NFR BLD AUTO: 8.5 % (ref 5–12)
NEUTROPHILS NFR BLD AUTO: 4.11 10*3/MM3 (ref 1.7–7)
NEUTROPHILS NFR BLD AUTO: 58.5 % (ref 42.7–76)
NRBC BLD AUTO-RTO: 0 /100 WBC (ref 0–0.2)
PLATELET # BLD AUTO: 265 10*3/MM3 (ref 140–450)
PMV BLD AUTO: 9.4 FL (ref 6–12)
RBC # BLD AUTO: 4.81 10*6/MM3 (ref 4.14–5.8)
WBC NRBC COR # BLD AUTO: 7.02 10*3/MM3 (ref 3.4–10.8)

## 2025-04-28 PROCEDURE — 36415 COLL VENOUS BLD VENIPUNCTURE: CPT

## 2025-04-28 PROCEDURE — G2211 COMPLEX E/M VISIT ADD ON: HCPCS

## 2025-04-28 PROCEDURE — 1125F AMNT PAIN NOTED PAIN PRSNT: CPT

## 2025-04-28 PROCEDURE — 85025 COMPLETE CBC W/AUTO DIFF WBC: CPT

## 2025-04-28 PROCEDURE — 86140 C-REACTIVE PROTEIN: CPT

## 2025-04-28 PROCEDURE — 1159F MED LIST DOCD IN RCRD: CPT

## 2025-04-28 PROCEDURE — 85652 RBC SED RATE AUTOMATED: CPT

## 2025-04-28 PROCEDURE — 99214 OFFICE O/P EST MOD 30 MIN: CPT

## 2025-04-28 PROCEDURE — 1160F RVW MEDS BY RX/DR IN RCRD: CPT

## 2025-04-28 RX ORDER — HYDROXYZINE HYDROCHLORIDE 25 MG/1
25 TABLET, FILM COATED ORAL 3 TIMES DAILY PRN
Qty: 60 TABLET | Refills: 0 | Status: SHIPPED | OUTPATIENT
Start: 2025-04-28

## 2025-04-28 NOTE — PROGRESS NOTES
Referring Physician: No referring provider defined for this encounter.    Primary Physician: Juan Sosa MD    CHIEF COMPLAINT or REASON FOR VISIT: Follow-up      Initial history of present illness on 10/21/2024:  Mr. Brandon Brooks is 80 y.o. male who presents as a new patient referral for evaluation treatment chronic low back pain with radiation right lower extremity and foot.  Patient reports a multiyear history of chronic back issues and right lower extremity numbness tingling and pain.  Pain is historically been L5 dermatome.  He underwent PLIF with Dr. Sood at L5/S1 in March 2023.  Unfortunately the surgery did not seem to resolve him symptoms.  He does state that it was somewhat helpful but he continues to complain of the same numbness tingling and pain down his right leg.  He was again evaluated by neurosurgery, Lucy Chavira PA-C, PA-C, who did not identify any role for surgical intervention and referred to pain management.  He has tried gabapentin with minimal benefit in the past.  He has tried epidural steroid injections with minimal benefit in the past.  Patient denies any bowel or bladder dysfunction, lower extremity weakness, new onset saddle anesthesia or unexplained weight loss.       Interval history:  Patient returns to clinic today for ongoing management of spinal cord stimulator.  He reports good relief from his spinal cord stimulator.  He believes he is receiving more relief today than he did during his trial.  He still has some numbness within his right foot.  Unfortunately, he has been dealing with a diffuse rash mostly over his chest and abdomen  over the last couple weeks.  Rash is present along the upper arms and neck.  He denies any new chest pain, shortness of breath, fever, night sweats.  He has noticed some chills recently.  His incision has healed excellent without any surrounding signs of infection.  He denies any specific signs of systemic infection.  He does question  if he is allergic to the implant.  He denies any changing of laundry detergent, soaps, clothes prior to onset of rash.  He denies any changing of medications or pressures on other mucosal surfaces such as genitals.    Interventions:  12/19/2024: SCS trial [employer] with 50% relief  2/4/2025: SCS permanent implant [employer] (top of T7/top of T8) with greater than 50% relief ongoing    Objective Pain Scoring:   BRIEF PAIN INVENTORY:  Total score:   Pain Score    04/28/25 1001   PainSc: 6    PainLoc: Leg              PHQ-2: 0  PHQ-9:    Opioid Risk Tool:         Review of Systems:   ROS negative except as otherwise noted     Past Medical History:   Past Medical History:   Diagnosis Date    Arthritis     Cancer     prostate cancer    GERD (gastroesophageal reflux disease)     Hearing loss     Hypertension     Irregular heart beat     Low back pain     PONV (postoperative nausea and vomiting)          Past Surgical History:   Past Surgical History:   Procedure Laterality Date    CATARACT EXTRACTION Bilateral     HERNIA REPAIR      LUMBAR DISC SURGERY  1990    Dr cameron    LUMBAR LAMINECTOMY WITH FUSION N/A 3/27/2023    Procedure: LUMBAR FUSION DECOMPRESSON WITH PEDICLE SCREWS L5-S1;  Surgeon: Lenin Sood MD;  Location: [facility];  Service: Neurosurgery;  Laterality: N/A;    PROSTATECTOMY           Family History   Family History   Problem Relation Age of Onset    Cancer Mother     Heart disease Father     Cancer Brother          Social History   Social History     Socioeconomic History    Marital status:    Tobacco Use    Smoking status: Never    Smokeless tobacco: Never   Vaping Use    Vaping status: Never Used   Substance and Sexual Activity    Alcohol use: Never    Drug use: Never    Sexual activity: Defer        Medications:     Current Outpatient Medications:     acetaminophen (TYLENOL) 325 MG tablet, Take 2 tablets by mouth Every 6 (Six) Hours As Needed for Mild Pain. On rare occassion, Disp: , Rfl:      "aspirin 81 MG chewable tablet, Chew 1 tablet Daily., Disp: , Rfl:     cyclobenzaprine (FLEXERIL) 10 MG tablet, Take 1 tablet by mouth 3 (Three) Times a Day As Needed for Muscle Spasms., Disp: 30 tablet, Rfl: 0    lisinopril-hydrochlorothiazide (PRINZIDE,ZESTORETIC) 20-12.5 MG per tablet, Take 1 tablet by mouth Daily., Disp: , Rfl:     PEG 3350-KCl-NaBcb-NaCl-NaSulf (PEG-3350/Electrolytes) 236 g reconstituted solution, , Disp: , Rfl:     pregabalin (LYRICA) 50 MG capsule, Take 1 capsule by mouth 2 (Two) Times a Day., Disp: 60 capsule, Rfl: 1    triamcinolone (KENALOG) 0.1 % cream, , Disp: , Rfl:     cloNIDine (CATAPRES) 0.1 MG tablet, Take 1 tablet by mouth As Needed. Take one for systolic higher than 165 or diastole higher than 100 (Patient not taking: Reported on 10/21/2024), Disp: , Rfl:     ezetimibe (ZETIA) 10 MG tablet, Take 1 tablet by mouth Daily. (Patient not taking: Reported on 10/21/2024), Disp: , Rfl:     HYDROcodone-acetaminophen (NORCO) 7.5-325 MG per tablet, Take 1 tablet by mouth Every 6 (Six) Hours As Needed for Moderate Pain. (Patient not taking: Reported on 2/17/2025), Disp: 12 tablet, Rfl: 0    metoprolol succinate XL (TOPROL-XL) 25 MG 24 hr tablet, Take 1 tablet by mouth Daily., Disp: , Rfl:         Physical Exam:     Vitals:    04/28/25 1001   Weight: 92.1 kg (203 lb 1.6 oz)   Height: 180.3 cm (70.98\")   PainSc: 6    PainLoc: Leg                General: Alert and oriented, No acute distress.   HEENT: Normocephalic, atraumatic.   Pupils are round equal and reactive to light.  No signs of direct trauma or injury.  Cardiovascular: No gross edema  Respiratory: Respirations are non-labored       Lumbar Spine:   No masses or atrophy  Range of motion - Flexion normal. Extension reduced.    Facet Loading: Positive bilaterally  Facet Palpation - Nontender   Ana Luisa finger/Gaenslen's/Wilber's/MARCO ANTONIO/Thigh thrust -   Straight leg raise/slump test: Negative bilaterally  Multifidus toe-touch test:    Motor " Exam:       Strength: Rate on 1-5 scale Right Left    L1/2- hip flexion 5/5  5/5    L3- knee extension 5/5  5/5    L4- ankle dorsiflexion 5/5  5/5    L5- great toe extension 5/5  5/5    S1- ankle plantarflexion 5/5  5/5    Sensory Exam: Altered sensation right lower extremity       Neurologic: Cranial Nerves II-XII are grossly intact.      Psychiatric: Cooperative.   Gait: Normal   Assistive Devices: None    Diffuse rash over the chest and abdomen.  Rash is present along the lateral aspect of his neck and medial aspect of his upper extremities.  His incisions  have healed appropriately without any surrounding signs of infection such as erythema, induration, inflammation, or purulent drainage.  Incisions are clean dry and intact.    No intraoral lesions      IPG incision site and percutaneous lead insertion incision site continue to heal appropriately without surrounding erythema, inflammation, induration, or purulent drainage.  Incisions are clean dry and intact.    Imaging Studies:   Results for orders placed during the hospital encounter of 10/09/24    MRI Lumbar Spine Without Contrast    Narrative  MRI LUMBAR SPINE WO CONTRAST    Date of Exam: 10/9/2024 10:29 AM EDT    Indication: h/o fusion, new lumbar and sciatic pain.    Comparison: None available.    Technique:  Routine multiplanar/multisequence sequence images of the lumbar spine were obtained without contrast administration.      Findings:  There are normal vertebral body heights. There is straightening of curvature.    Conus terminates at L1 and appears grossly normal. There is a small subligamentous disc protrusion at T12-L1 without significant spinal stenosis. There is slight disc bulge at L1-2 without spinal stenosis or neural foraminal narrowing.    There is mild to moderate narrowing of the L2-3 disc and there is slight retrolisthesis of L2 on L3. There is facet joint hypertrophy. There is a moderate degree of spinal stenosis. There is mild  bilateral neural foraminal narrowing without focal nerve  root impingement.    There is mild retrolisthesis of L3 on L4. Severe disc narrowing. There is facet joint hypertrophy. There is moderately severe spinal stenosis. There is moderately severe neural foraminal stenosis with impingement of portions of exiting L3 nerve roots  bilaterally.    There is moderately severe narrowing of the L4-5 disc. There is facet joint hypertrophy. There is moderate narrowing of the canal in the sagittal plane. There is moderate bilateral neural foraminal stenosis with some impingement of exiting L4 nerve roots  bilaterally.    There is artifact from L5 and S1 pedicle screws. There are posterior laminectomy defects at this level. There is no spinal stenosis at L5-S1. There is normal alignment at the operative site. Artifact obscures detail of the neural foramina.    Impression  Impression:  Moderate spinal stenosis L2-3 and L4-5. Moderately severe spinal stenosis L3-4. Bilateral neural foraminal impingement L3-4 and L4-5. Postsurgical changes L5-S1.      Electronically Signed: Hoa Wright MD  10/10/2024 12:36 PM EDT  Workstation ID: [id number]        Independent review of radiographic imaging: Available for my interpretation is a lumbar MRI dated October 9, 2024 demonstrating L5/S1 PLIF; L2/L3 moderate canal stenosis; moderate right L3/L4 neuroforaminal stenosis; endplate edema at L2/L3 and L3/L4.    Impression & Plan:       10/21/2024: Brandon Brooks is a 80 y.o. male with past medical history significant for arthritis, CKD, diabetes, HLD, HTN, laminectomy with L5/S1 fusion with Dr. Sood in March 2023 who presents to the pain clinic for evaluation and treatment of persistent chronic low back pain with radiation right lower extremity.  MRI interpretation as above.  Evaluation consistent with postlaminectomy syndrome.  We discussed spinal cord stimulator trial.  I had a discussion with the patient regarding the risks of the  procedure including bleeding, infection, damage to surrounding structures, infection, migration.  Additionally we will trial pregabalin.  11/19/2024: Will obtain appropriate psychiatric clearance.  Increase pregabalin.  Will plan for spinal cord stimulator trial with [employer]  12/23/2024: Good relief from SCS trial with [employer].  Will plan for permanent implant.  2/10/2025: 1 week s/p permanent SCS implant with [employer].  Patient recovering appropriately.  Incisions healing appropriately.  Discussed continued postoperative restrictions as well as wound care instructions.  2/17/2025: 2-week s/p permanent SCS implant with [employer].  Good relief thus far.  Patient recovering appropriately incisions healing appropriately.  Continue postoperative restrictions.  Discussed wound care instructions.  3/17/2025: 6 weeks s/p permanent SCS implant with [employer].  Good relief of lower back pain.  Continued right lower extremity symptoms.  [employer] device rep here for reprogramming.  Postoperative restrictions lifted.  Incisions healing appropriately.  4/28/2025: Good relief from spinal cord stimulator implant with [employer].  Continued right foot symptoms.  Will provide patient with allergy kit from [employer] to determine if he has allergy to the device.  Will send hydroxyzine for pruritus    1. Rash    2. S/P insertion of spinal cord stimulator    3. Postlaminectomy syndrome                    PLAN:  1. Medication Recommendations: Recommend Voltaren topical, NSAIDs, Tylenol.  Can trial turmeric 500 mg twice daily if NSAID contraindicated.  -Can consider restarting pregabalin; patient prefers to avoid medications if he can  - Start hydroxyzine 25 mg 1/2 to 1 tablet 3 times daily as needed for pruritus.  I did advise him of the side effects of this medication including drowsiness, unsteadiness, sedation.  He voiced understanding.      2. Physical Therapy: Continue HEP      3. Psychological:    4. Complementary and alternative (CAM) Therapies:      5. Labs/Diagnostic studies: Compliant UDS as of 2024.  Obtain CBC, ESR, CRP    6. Imagin. Interventions: s/p permanent SCS implant with [employer] with good relief    Incisions are healing appropriately without surrounding erythema, inflammation, induration, or purulent drainage.  Incisions are clean dry and intact.  Diffuse rash present along chest and abdomen.  No signs of systemic or local infection.  - Will provide patient with allergy testing kit from [employer].    8. Referrals: None indicated     9. Records:     10. Lifestyle goals:    Follow-up 2 weeks    [facility]  Marcio Hinds PA-C          Quality Metrics:

## 2025-04-29 ENCOUNTER — TELEPHONE (OUTPATIENT)
Dept: PAIN MEDICINE | Facility: CLINIC | Age: 80
End: 2025-04-29
Payer: MEDICARE

## 2025-05-12 ENCOUNTER — TELEPHONE (OUTPATIENT)
Dept: PAIN MEDICINE | Facility: CLINIC | Age: 80
End: 2025-05-12

## 2025-05-12 NOTE — TELEPHONE ENCOUNTER
Caller: Brandon Brooks    Relationship: Self    Best call back number: [phone]    What is the best time to reach you: ANY     Who are you requesting to speak with (clinical staff, provider,  specific staff member): CLINICAL     Do you know the name of the person who called: YES     What was the call regarding: PATIENT STATES THAT HE WAS SEEN FRIDAY IN OFFICE 05/09/2025 BY THE TEAM AND FELT THAT 05/12/2025 WAS NOT NEEDED UNLESS THE PROVIDER WANTED HIM TO RESCHEDULE-     Is it okay if the provider responds through MyChart: NO

## 2025-05-12 NOTE — TELEPHONE ENCOUNTER
Attempted to contact patient but no answer. LVM to call the office back and provided a call back number.

## 2025-05-12 NOTE — TELEPHONE ENCOUNTER
"Surgery/Procedure:  Spinal Cord Stimulator Implant, Two Leads   Surgery/Procedure Date:  02/04/2025  Last visit:   Office Visit with Marcio Hinds PA-C (04/28/2025)   Next visit: N/A     Reason for call:    Copied from the HUB:    \"What was the call regarding: Patient states that he was seen Friday in office 05/09/2025 by the team and felt that 05/12/2025 was not needed unless the provider wanted him to reschedule-.\"  "

## 2025-05-13 NOTE — TELEPHONE ENCOUNTER
S/w patient and relayed Bronson's message. Patient is scheduled to see dermatology tomorrow and will call our office to let us know what was discussed.   Patient is scheduled to see Bronson on 08/13/2025.   Patient verbalizes understanding to keep an eye on IPG site/incision. He will call if he has any questions or concerns. Closing TE.

## 2025-08-13 ENCOUNTER — OFFICE VISIT (OUTPATIENT)
Dept: PAIN MEDICINE | Facility: CLINIC | Age: 80
End: 2025-08-13
Payer: MEDICARE

## 2025-08-13 VITALS — BODY MASS INDEX: 27.44 KG/M2 | HEIGHT: 71 IN | WEIGHT: 196 LBS

## 2025-08-13 DIAGNOSIS — G89.4 CHRONIC PAIN SYNDROME: ICD-10-CM

## 2025-08-13 DIAGNOSIS — Z96.89 S/P INSERTION OF SPINAL CORD STIMULATOR: Primary | ICD-10-CM

## 2025-08-13 DIAGNOSIS — R21 RASH: ICD-10-CM

## 2025-08-13 DIAGNOSIS — M96.1 POSTLAMINECTOMY SYNDROME: ICD-10-CM

## 2025-08-13 DIAGNOSIS — Z51.81 THERAPEUTIC DRUG MONITORING: ICD-10-CM

## 2025-08-13 RX ORDER — ROSUVASTATIN CALCIUM 5 MG/1
5 TABLET, COATED ORAL DAILY
COMMUNITY
Start: 2025-06-12

## 2025-08-13 RX ORDER — CHOLECALCIFEROL (VITAMIN D3) 50 MCG
2000 TABLET ORAL DAILY
COMMUNITY

## 2025-08-14 ENCOUNTER — PATIENT ROUNDING (BHMG ONLY) (OUTPATIENT)
Dept: PAIN MEDICINE | Facility: CLINIC | Age: 80
End: 2025-08-14
Payer: MEDICARE

## (undated) DEVICE — ANTIBACTERIAL UNDYED BRAIDED (POLYGLACTIN 910), SYNTHETIC ABSORBABLE SUTURE: Brand: COATED VICRYL

## (undated) DEVICE — SPHR MARKR STEALTH STATION

## (undated) DEVICE — JACKSON TABLE POSITIONER KIT: Brand: MEDLINE INDUSTRIES, INC.

## (undated) DEVICE — ELECTRD BLD EZ CLN MOD 4IN

## (undated) DEVICE — PATIENT RETURN ELECTRODE, SINGLE-USE, CONTACT QUALITY MONITORING, ADULT, WITH 9FT CORD, FOR PATIENTS WEIGING OVER 33LBS. (15KG): Brand: MEGADYNE

## (undated) DEVICE — PCH INST SURG INVISISHIELD 2PCKT

## (undated) DEVICE — TOOL MR8-14MH30 MR8 14CM MATCH 3MM: Brand: MIDAS REX MR8

## (undated) DEVICE — DRAPE,TOP,102X53,STERILE: Brand: MEDLINE

## (undated) DEVICE — DRSNG WND GZ PAD BORDERED 4X8IN STRL

## (undated) DEVICE — TRAP,MUCUS SPECIMEN,40CC: Brand: MEDLINE

## (undated) DEVICE — SUT VIC 0 CTD BR 18IN UNDYE VCP724D

## (undated) DEVICE — STRIP,CLOSURE,WOUND,MEDI-STRIP,1/2X4: Brand: MEDLINE

## (undated) DEVICE — SUT ETHLN 3/0 FS1 30IN 669H

## (undated) DEVICE — KT DRN EVAC WND PVC PCH WTROC RND 10F400

## (undated) DEVICE — GOWN,REINFORCE,POLY,SIRUS,BREATH SLV,XLG: Brand: MEDLINE

## (undated) DEVICE — SNAP KOVER: Brand: UNBRANDED

## (undated) DEVICE — SHEET,DRAPE,40X58,STERILE: Brand: MEDLINE

## (undated) DEVICE — GLV SURG PREMIERPRO MIC LTX PF SZ7.5 BRN

## (undated) DEVICE — BLANKT WARM UPPR/BDY ARM/OUT 57X196CM

## (undated) DEVICE — PACK,UNIVERSAL,NO GOWNS: Brand: MEDLINE

## (undated) DEVICE — ADHS LIQ MASTISOL 2/3ML

## (undated) DEVICE — GLV SURG PREMIERPRO MIC LTX PF SZ6.5 BRN

## (undated) DEVICE — DRP CASSET 10 RAY LG 24X40

## (undated) DEVICE — IRRIGATOR BULB ASEPTO 60CC STRL

## (undated) DEVICE — SUT VIC PLS CTD ANTIB BR 3/0 8/18IN 45CM

## (undated) DEVICE — TBG PENCL TELESCP MEGADYNE SMOKE EVAC 10FT

## (undated) DEVICE — PK NEURO DISC 10

## (undated) DEVICE — STRAP POSTN KN/BDY FM 5X72IN DISP